# Patient Record
Sex: FEMALE | Race: WHITE | Employment: OTHER | URBAN - NONMETROPOLITAN AREA
[De-identification: names, ages, dates, MRNs, and addresses within clinical notes are randomized per-mention and may not be internally consistent; named-entity substitution may affect disease eponyms.]

---

## 2020-08-12 DIAGNOSIS — M17.11 PRIMARY OSTEOARTHRITIS OF RIGHT KNEE: Primary | ICD-10-CM

## 2020-09-16 ENCOUNTER — HOSPITAL ENCOUNTER (OUTPATIENT)
Dept: GENERAL RADIOLOGY | Age: 70
Discharge: HOME | End: 2020-09-16
Payer: MEDICARE

## 2020-09-16 ENCOUNTER — HOSPITAL ENCOUNTER (OUTPATIENT)
Dept: LAB | Age: 70
Discharge: HOME | End: 2020-09-16
Payer: MEDICARE

## 2020-09-16 ENCOUNTER — HOSPITAL ENCOUNTER (OUTPATIENT)
Dept: NON INVASIVE DIAGNOSTICS | Age: 70
Discharge: HOME | End: 2020-09-16
Payer: MEDICARE

## 2020-09-16 DIAGNOSIS — M17.11 PRIMARY OSTEOARTHRITIS OF RIGHT KNEE: ICD-10-CM

## 2020-09-16 LAB
ANION GAP SERPL CALC-SCNC: 7 MMOL/L
ATRIAL RATE: 87 BPM
BASOPHILS # BLD: 0 K/UL (ref 0–0.1)
BASOPHILS NFR BLD: 0 % (ref 0–2)
BUN SERPL-MCNC: 27 MG/DL (ref 9–21)
BUN/CREAT SERPL: 39
CA-I BLD-MCNC: 9.3 MG/DL (ref 8.5–10.5)
CALCULATED P AXIS, ECG09: 22 DEGREES
CALCULATED R AXIS, ECG10: 16 DEGREES
CALCULATED T AXIS, ECG11: 25 DEGREES
CHLORIDE SERPL-SCNC: 106 MMOL/L (ref 94–111)
CO2 SERPL-SCNC: 26 MMOL/L (ref 21–33)
CREAT SERPL-MCNC: 0.7 MG/DL (ref 0.7–1.2)
DIAGNOSIS, 93000: NORMAL
EOSINOPHIL # BLD: 0.1 K/UL (ref 0–0.4)
EOSINOPHIL NFR BLD: 3 % (ref 0–5)
ERYTHROCYTE [DISTWIDTH] IN BLOOD BY AUTOMATED COUNT: 13.1 % (ref 11.6–14.5)
GLUCOSE SERPL-MCNC: 94 MG/DL (ref 70–110)
HCT VFR BLD AUTO: 41.5 % (ref 35–45)
HGB BLD-MCNC: 13.2 % (ref 12–16)
IMM GRANULOCYTES # BLD AUTO: 0 K/UL
IMM GRANULOCYTES NFR BLD AUTO: 0 %
LYMPHOCYTES # BLD: 1.8 K/UL (ref 0.9–3.6)
LYMPHOCYTES NFR BLD: 38 % (ref 21–52)
MCH RBC QN AUTO: 31.5 PG (ref 24–34)
MCHC RBC AUTO-ENTMCNC: 31.8 G/DL (ref 31–37)
MCV RBC AUTO: 99 FL (ref 74–97)
MONOCYTES # BLD: 0.4 K/UL (ref 0.05–1.2)
MONOCYTES NFR BLD: 8 % (ref 3–10)
NEUTS SEG # BLD: 2.5 K/UL (ref 1.8–8)
NEUTS SEG NFR BLD: 51 % (ref 40–73)
P-R INTERVAL, ECG05: 158 MS
PLATELET # BLD AUTO: 361 K/UL (ref 135–420)
PMV BLD AUTO: 10.3 FL
POTASSIUM SERPL-SCNC: 3.4 MMOL/L (ref 3.2–5.1)
Q-T INTERVAL, ECG07: 387 MS
QRS DURATION, ECG06: 103 MS
QTC CALCULATION (BEZET), ECG08: 466 MS
RBC # BLD AUTO: 4.19 M/UL (ref 4.2–5.3)
SODIUM SERPL-SCNC: 139 MMOL/L (ref 135–145)
VENTRICULAR RATE, ECG03: 87 BPM
WBC # BLD AUTO: 4.8 K/UL (ref 4.6–13.2)

## 2020-09-16 PROCEDURE — 71046 X-RAY EXAM CHEST 2 VIEWS: CPT

## 2020-09-16 PROCEDURE — 85025 COMPLETE CBC W/AUTO DIFF WBC: CPT

## 2020-09-16 PROCEDURE — 93005 ELECTROCARDIOGRAM TRACING: CPT

## 2020-09-16 PROCEDURE — 80048 BASIC METABOLIC PNL TOTAL CA: CPT

## 2020-09-16 PROCEDURE — 36415 COLL VENOUS BLD VENIPUNCTURE: CPT

## 2020-09-25 PROBLEM — I10 ESSENTIAL HYPERTENSION: Status: ACTIVE | Noted: 2020-09-25

## 2020-09-25 RX ORDER — BUPROPION HYDROCHLORIDE 150 MG/1
150 TABLET ORAL
COMMUNITY

## 2020-09-25 RX ORDER — ASPIRIN 81 MG/1
81 TABLET ORAL DAILY
COMMUNITY

## 2020-09-25 RX ORDER — CALCIUM CARBONATE/VITAMIN D3 600 MG-125
1 TABLET ORAL
COMMUNITY

## 2020-09-25 RX ORDER — SIMVASTATIN 40 MG/1
40 TABLET, FILM COATED ORAL
COMMUNITY

## 2020-09-25 RX ORDER — PANTOPRAZOLE SODIUM 40 MG/1
40 TABLET, DELAYED RELEASE ORAL DAILY
COMMUNITY

## 2020-09-25 RX ORDER — BISMUTH SUBSALICYLATE 262 MG
1 TABLET,CHEWABLE ORAL DAILY
COMMUNITY

## 2020-09-25 RX ORDER — ERGOCALCIFEROL 1.25 MG/1
50000 CAPSULE ORAL
COMMUNITY

## 2020-09-25 RX ORDER — SULINDAC 200 MG/1
200 TABLET ORAL 2 TIMES DAILY
COMMUNITY

## 2020-09-25 RX ORDER — LOSARTAN POTASSIUM AND HYDROCHLOROTHIAZIDE 25; 100 MG/1; MG/1
1 TABLET ORAL DAILY
COMMUNITY

## 2020-09-28 ENCOUNTER — TRANSCRIBE ORDER (OUTPATIENT)
Dept: REGISTRATION | Age: 70
End: 2020-09-28

## 2020-09-28 ENCOUNTER — HOSPITAL ENCOUNTER (OUTPATIENT)
Dept: PREADMISSION TESTING | Age: 70
Discharge: HOME | End: 2020-09-28
Payer: MEDICARE

## 2020-09-28 ENCOUNTER — HOSPITAL ENCOUNTER (OUTPATIENT)
Dept: LAB | Age: 70
Discharge: HOME | End: 2020-09-28
Payer: MEDICARE

## 2020-09-28 ENCOUNTER — OFFICE VISIT (OUTPATIENT)
Dept: ORTHOPEDIC SURGERY | Age: 70
End: 2020-09-28

## 2020-09-28 VITALS — BODY MASS INDEX: 39.65 KG/M2 | WEIGHT: 210 LBS | HEIGHT: 61 IN

## 2020-09-28 DIAGNOSIS — M17.11 PRIMARY OSTEOARTHRITIS OF RIGHT KNEE: Primary | ICD-10-CM

## 2020-09-28 DIAGNOSIS — M25.561 CHRONIC PAIN OF RIGHT KNEE: ICD-10-CM

## 2020-09-28 DIAGNOSIS — G89.29 CHRONIC PAIN OF RIGHT KNEE: ICD-10-CM

## 2020-09-28 DIAGNOSIS — E66.01 SEVERE OBESITY (HCC): ICD-10-CM

## 2020-09-28 DIAGNOSIS — M17.11 OSTEOARTHRITIS OF RIGHT KNEE: ICD-10-CM

## 2020-09-28 DIAGNOSIS — M17.11 OSTEOARTHRITIS OF RIGHT KNEE: Primary | ICD-10-CM

## 2020-09-28 LAB — SARS-COV-2, COV2: NORMAL

## 2020-09-28 PROCEDURE — 87635 SARS-COV-2 COVID-19 AMP PRB: CPT

## 2020-09-28 RX ORDER — OXYCODONE AND ACETAMINOPHEN 5; 325 MG/1; MG/1
1 TABLET ORAL
Qty: 30 TAB | Refills: 0 | Status: SHIPPED | OUTPATIENT
Start: 2020-09-28 | End: 2020-10-05

## 2020-09-28 RX ORDER — CEPHALEXIN 500 MG/1
500 CAPSULE ORAL 4 TIMES DAILY
Qty: 32 CAP | Refills: 0 | Status: SHIPPED | OUTPATIENT
Start: 2020-09-28 | End: 2020-10-06

## 2020-09-28 NOTE — H&P (VIEW-ONLY)
[facility] Preop Visit Subjective: 
  
Dori Olmstead is a 71 y.o. female who presents today for preoperative visit in preparation for upcoming right total knee replacement to be performed by Dr. Scott Gomez, on an outpatient basis. Xrays and additional studies, as appropriate, have been reviewed. Pt currently without new complaint. Past Medical History:  
Diagnosis Date  Dyslipidemia with elevated low density lipoprotein (LDL) cholesterol and abnormally low high density lipoprotein cholesterol  Essential hypertension 9/25/2020  GERD (gastroesophageal reflux disease)  Hypertension Past Surgical History:  
Procedure Laterality Date  HX ORTHOPAEDIC    
 foot surgery Current Outpatient Medications Medication Sig Dispense Refill  sulindac (CLINORIL) 200 mg tablet Take 200 mg by mouth two (2) times a day.  pantoprazole (PROTONIX) 40 mg tablet Take 40 mg by mouth daily.  losartan-hydroCHLOROthiazide (HYZAAR) 100-25 mg per tablet Take 1 Tab by mouth daily.  buPROPion XL (WELLBUTRIN XL) 150 mg tablet Take 150 mg by mouth every morning.  simvastatin (ZOCOR) 40 mg tablet Take 40 mg by mouth nightly.  ergocalciferol (Vitamin D2) 1,250 mcg (50,000 unit) capsule Take 50,000 Units by mouth every seven (7) days. Indications: saturday  aspirin delayed-release 81 mg tablet Take 81 mg by mouth daily.  multivitamin (ONE A DAY) tablet Take 1 Tab by mouth daily.  calcium-cholecalciferol, d3, (CALCIUM 600 + D) 600-125 mg-unit tab Take 1 Tab by mouth. No Known Allergies ROS: 
 
Patient is a pleasant appearing individual, appropriately dressed, well hydrated, well nourished, who is alert, appropriately oriented for age, and in no acute distress with a limp gait and normal affect who does not appear to be in any significant pain. Remainder of ROS as per HPI. Objective:  
 
Physical Exam: VSS AFEB Left knee - Neurovascularly intact with good cap refill, full range of motion and full strength, well healed incision noted, no swelling, no erythema, no instability. Right knee - Decrease range of motion with flexion, Some crepitation, Grossly neurovascularly intact, Good cap refill, No skin lesion, Moderate swelling, No gross instability, Some quadriceps weakness. Obvious cat scratch without e/o infection. Studies to date: 
 
Xrays: And all diagnostic studies have been reviewed Labs: Have been reviewed General Medicine evaluation: She has been cleared for surgery by her primary care team. 
 
Post operative Antibiotics: Keflex which we will start today as a result of her recent cat scratch. She will contact the office on Friday if she does not feel as though the wounds are improved. Post operative Pain Medications: Oxycodone Post operative Blood thinning meds: Aspirin Post operative medications, DME and physical therapy prescriptions have been provided. (Meds sent to pharmacy) Assessment:  
Primary osteoarthritis of right knee [M17.11] Lucero Uriarte is a 71 y.o. female who is planning to undergo a right total knee replacement to be performed by Dr. Rico Dexter in the near future. We will plan to proceed on an outpatient  basis. At this time, they are an appropriate candidate for surgery. The risks, benefits, complications and alternatives have been outlined with the patient at length and they voice an understanding. Based on the fact that we agree that the potential benefits outweigh the potential lists, we will plan to proceed as discussed. The patient was counseled about the risks of misha Covid-19 during their perioperative period and any recovery window from their procedure.  The patient was made aware that misha Covid-19 may worsen their prognosis for recovering from their procedure and lend to a higher morbidity and/or mortality risk. All material risks, benefits, and reasonable alternatives including postponing the procedure were discussed. The patient DOES wish to proceed with their procedure at this time. Ms. Divina Morse has a reminder for a \"due or due soon\" health maintenance. I have asked that she contact her primary care provider for follow-up on this health maintenance. Plan:  
-Proceed with right total knee arthroplasty to be performed by DR. Whelan at [facility] on an outpatient  basis. -Preoperative instructions have been reviewed with and provided to the patient, at length 
-Patient voices understanding that any skin abnormality to the skin at the site of the involved area of planned surgery may result in cancellation and/or postponement of surgery. 
-Physical therapy  to start shortly after surgery. 
-Follow up 1 week postoperatively with me for close follow up.  
 
Alejandra Frank, MS, PAYifanC

## 2020-09-28 NOTE — PATIENT INSTRUCTIONS
Pre operative instructions 1. Start antibiotics day after surgery unless otherwise instructed. 2. Pain meds, start the night you have surgery whether you need it or not. Your block will be wearing off and we want meds in your system. After initial dose, use only as needed. 3. Remove Ace wrap 48 hours after surgery. 4. Compression stockings-on your operative leg for 3 weeks, non-operative leg for 1 week 5. Bandage will be removed by Dr. Ventura Acosta staff 1 week post op. 6. Ice for 20-30 minutes no more than 5 times daily 7. Aspirin 325 mg twice daily to start day after surgery 8. No showers until first follow up visit (1 week postop). 9. No driving until instructed you may drive by Dr. Ventura Acosta staff 10. No alcohol prior to surgery and/or while on pain meds postop. 11. Incentive spirometry to begin immediately postop. IS, Deep Breathing  and Coughing 10X/hr while awake for 2 weeks. 12. Outpatient PT 1-3 days postop. Any questions, thoughts feelings and/or concerns contact the office.

## 2020-09-28 NOTE — PROGRESS NOTES
[facility]  Preop Visit    Subjective:     Susan Malone is a 71 y.o. female who presents today for preoperative visit in preparation for upcoming right total knee replacement to be performed by Dr. Bonnie Dean, on an outpatient basis. Xrays and additional studies, as appropriate, have been reviewed. Pt currently without new complaint. Past Medical History:   Diagnosis Date    Dyslipidemia with elevated low density lipoprotein (LDL) cholesterol and abnormally low high density lipoprotein cholesterol     Essential hypertension 9/25/2020    GERD (gastroesophageal reflux disease)     Hypertension        Past Surgical History:   Procedure Laterality Date    HX ORTHOPAEDIC      foot surgery       Current Outpatient Medications   Medication Sig Dispense Refill    sulindac (CLINORIL) 200 mg tablet Take 200 mg by mouth two (2) times a day.  pantoprazole (PROTONIX) 40 mg tablet Take 40 mg by mouth daily.  losartan-hydroCHLOROthiazide (HYZAAR) 100-25 mg per tablet Take 1 Tab by mouth daily.  buPROPion XL (WELLBUTRIN XL) 150 mg tablet Take 150 mg by mouth every morning.  simvastatin (ZOCOR) 40 mg tablet Take 40 mg by mouth nightly.  ergocalciferol (Vitamin D2) 1,250 mcg (50,000 unit) capsule Take 50,000 Units by mouth every seven (7) days. Indications: saturday      aspirin delayed-release 81 mg tablet Take 81 mg by mouth daily.  multivitamin (ONE A DAY) tablet Take 1 Tab by mouth daily.  calcium-cholecalciferol, d3, (CALCIUM 600 + D) 600-125 mg-unit tab Take 1 Tab by mouth. No Known Allergies    ROS:    Patient is a pleasant appearing individual, appropriately dressed, well hydrated, well nourished, who is alert, appropriately oriented for age, and in no acute distress with a limp gait and normal affect who does not appear to be in any significant pain. Remainder of ROS as per HPI.           Objective:     Physical Exam:  VSS AFEB    Left knee - Neurovascularly intact with good cap refill, full range of motion and full strength, well healed incision noted, no swelling, no erythema, no instability. Right knee - Decrease range of motion with flexion, Some crepitation, Grossly neurovascularly intact, Good cap refill, No skin lesion, Moderate swelling, No gross instability, Some quadriceps weakness. Obvious cat scratch without e/o infection. Studies to date:    Xrays: And all diagnostic studies have been reviewed    Labs: Have been reviewed    General Medicine evaluation: She has been cleared for surgery by her primary care team.    Post operative Antibiotics: Keflex which we will start today as a result of her recent cat scratch. She will contact the office on Friday if she does not feel as though the wounds are improved. Post operative Pain Medications: Oxycodone  Post operative Blood thinning meds: Aspirin    Post operative medications, DME and physical therapy prescriptions have been provided. (Meds sent to pharmacy)    Assessment:   Primary osteoarthritis of right knee [M17.11]     Bindu Michelle is a 71 y.o. female who is planning to undergo a right total knee replacement to be performed by Dr. Hector Montilla in the near future. We will plan to proceed on an outpatient  basis. At this time, they are an appropriate candidate for surgery. The risks, benefits, complications and alternatives have been outlined with the patient at length and they voice an understanding. Based on the fact that we agree that the potential benefits outweigh the potential lists, we will plan to proceed as discussed. The patient was counseled about the risks of misha Covid-19 during their perioperative period and any recovery window from their procedure. The patient was made aware that misha Covid-19 may worsen their prognosis for recovering from their procedure and lend to a higher morbidity and/or mortality risk.  All material risks, benefits, and reasonable alternatives including postponing the procedure were discussed. The patient DOES wish to proceed with their procedure at this time. Ms. Mckenna Waters has a reminder for a \"due or due soon\" health maintenance. I have asked that she contact her primary care provider for follow-up on this health maintenance. Plan:   -Proceed with right total knee arthroplasty to be performed by DR[facility] Whelan at [facility] on an outpatient  basis. -Preoperative instructions have been reviewed with and provided to the patient, at length  -Patient voices understanding that any skin abnormality to the skin at the site of the involved area of planned surgery may result in cancellation and/or postponement of surgery.  -Physical therapy  to start shortly after surgery.  -Follow up 1 week postoperatively with me for close follow up.     Camilla Felton MS, PAYifanC

## 2020-09-29 LAB
CULTURE,CULT: NORMAL
CULTURE,CULT: NORMAL
SPECIAL REQUESTS,SREQ: NORMAL

## 2020-10-01 LAB — SARS-COV-2, COV2NT: NOT DETECTED

## 2020-10-02 ENCOUNTER — ANESTHESIA EVENT (OUTPATIENT)
Dept: SURGERY | Age: 70
End: 2020-10-02
Payer: MEDICARE

## 2020-10-05 ENCOUNTER — ANESTHESIA (OUTPATIENT)
Dept: SURGERY | Age: 70
End: 2020-10-05
Payer: MEDICARE

## 2020-10-05 ENCOUNTER — HOSPITAL ENCOUNTER (OUTPATIENT)
Age: 70
Setting detail: OUTPATIENT SURGERY
Discharge: HOME | End: 2020-10-05
Attending: ORTHOPAEDIC SURGERY | Admitting: ORTHOPAEDIC SURGERY
Payer: MEDICARE

## 2020-10-05 ENCOUNTER — APPOINTMENT (OUTPATIENT)
Dept: GENERAL RADIOLOGY | Age: 70
End: 2020-10-05
Attending: ORTHOPAEDIC SURGERY
Payer: MEDICARE

## 2020-10-05 VITALS
HEIGHT: 61 IN | RESPIRATION RATE: 20 BRPM | BODY MASS INDEX: 39.65 KG/M2 | HEART RATE: 88 BPM | WEIGHT: 210 LBS | DIASTOLIC BLOOD PRESSURE: 78 MMHG | SYSTOLIC BLOOD PRESSURE: 149 MMHG | TEMPERATURE: 98 F | OXYGEN SATURATION: 95 %

## 2020-10-05 DIAGNOSIS — M17.11 PRIMARY OSTEOARTHRITIS OF RIGHT KNEE: Primary | ICD-10-CM

## 2020-10-05 PROBLEM — M17.9 KNEE OSTEOARTHRITIS: Status: ACTIVE | Noted: 2020-10-05

## 2020-10-05 LAB
ABO + RH BLD: NORMAL
BLOOD GROUP ANTIBODIES SERPL: NEGATIVE
SPECIMEN EXP DATE BLD: NORMAL

## 2020-10-05 PROCEDURE — 97161 PT EVAL LOW COMPLEX 20 MIN: CPT

## 2020-10-05 PROCEDURE — C1713 ANCHOR/SCREW BN/BN,TIS/BN: HCPCS | Performed by: ORTHOPAEDIC SURGERY

## 2020-10-05 PROCEDURE — C1776 JOINT DEVICE (IMPLANTABLE): HCPCS | Performed by: ORTHOPAEDIC SURGERY

## 2020-10-05 PROCEDURE — 74011000272 HC RX REV CODE- 272: Performed by: ORTHOPAEDIC SURGERY

## 2020-10-05 PROCEDURE — 77030041690 HC SYS PINNING KN JNJ -D: Performed by: ORTHOPAEDIC SURGERY

## 2020-10-05 PROCEDURE — 74011250637 HC RX REV CODE- 250/637

## 2020-10-05 PROCEDURE — 76210000025 HC REC RM PH II 3 TO 3.5 HR: Performed by: ORTHOPAEDIC SURGERY

## 2020-10-05 PROCEDURE — 77030031139 HC SUT VCRL2 J&J -A: Performed by: ORTHOPAEDIC SURGERY

## 2020-10-05 PROCEDURE — 77030029372 HC ADH SKN CLSR PRINEO J&J -C: Performed by: ORTHOPAEDIC SURGERY

## 2020-10-05 PROCEDURE — 77030040375: Performed by: ORTHOPAEDIC SURGERY

## 2020-10-05 PROCEDURE — 77030002933 HC SUT MCRYL J&J -A: Performed by: ORTHOPAEDIC SURGERY

## 2020-10-05 PROCEDURE — 76060000035 HC ANESTHESIA 2 TO 2.5 HR: Performed by: ORTHOPAEDIC SURGERY

## 2020-10-05 PROCEDURE — 73560 X-RAY EXAM OF KNEE 1 OR 2: CPT

## 2020-10-05 PROCEDURE — 74011250636 HC RX REV CODE- 250/636: Performed by: NURSE ANESTHETIST, CERTIFIED REGISTERED

## 2020-10-05 PROCEDURE — 77030006835 HC BLD SAW SAG STRY -B: Performed by: ORTHOPAEDIC SURGERY

## 2020-10-05 PROCEDURE — 77030006812 HC BLD SAW RECIP STRY -B: Performed by: ORTHOPAEDIC SURGERY

## 2020-10-05 PROCEDURE — 77030042022 HC SYST COLD THRPY BREG -B: Performed by: ORTHOPAEDIC SURGERY

## 2020-10-05 PROCEDURE — 77030002982 HC SUT POLYSRB J&J -A: Performed by: ORTHOPAEDIC SURGERY

## 2020-10-05 PROCEDURE — 74011000258 HC RX REV CODE- 258: Performed by: NURSE ANESTHETIST, CERTIFIED REGISTERED

## 2020-10-05 PROCEDURE — 76010000131 HC OR TIME 2 TO 2.5 HR: Performed by: ORTHOPAEDIC SURGERY

## 2020-10-05 PROCEDURE — 86900 BLOOD TYPING SEROLOGIC ABO: CPT

## 2020-10-05 PROCEDURE — 77030013708 HC HNDPC SUC IRR PULS STRY –B: Performed by: ORTHOPAEDIC SURGERY

## 2020-10-05 PROCEDURE — 77030000032 HC CUF TRNQT ZIMM -B: Performed by: ORTHOPAEDIC SURGERY

## 2020-10-05 PROCEDURE — 74011250637 HC RX REV CODE- 250/637: Performed by: NURSE ANESTHETIST, CERTIFIED REGISTERED

## 2020-10-05 PROCEDURE — 74011250636 HC RX REV CODE- 250/636

## 2020-10-05 PROCEDURE — 74011000258 HC RX REV CODE- 258

## 2020-10-05 PROCEDURE — 76210000006 HC OR PH I REC 0.5 TO 1 HR: Performed by: ORTHOPAEDIC SURGERY

## 2020-10-05 PROCEDURE — 74011000250 HC RX REV CODE- 250: Performed by: NURSE ANESTHETIST, CERTIFIED REGISTERED

## 2020-10-05 PROCEDURE — 74011000250 HC RX REV CODE- 250: Performed by: ORTHOPAEDIC SURGERY

## 2020-10-05 PROCEDURE — 77030010783 HC BOWL MX BN CEM J&J -B: Performed by: ORTHOPAEDIC SURGERY

## 2020-10-05 PROCEDURE — 2709999900 HC NON-CHARGEABLE SUPPLY: Performed by: ORTHOPAEDIC SURGERY

## 2020-10-05 PROCEDURE — 77030018673: Performed by: ORTHOPAEDIC SURGERY

## 2020-10-05 DEVICE — COMPONENT PAT DIA35MM KNEE POLY DOME CEM MEDIALIZED ATTUNE: Type: IMPLANTABLE DEVICE | Site: KNEE | Status: FUNCTIONAL

## 2020-10-05 DEVICE — INSERT TIB SZ 5 THK7MM KNEE POST STBL ROT PLATFRM ATTUNE: Type: IMPLANTABLE DEVICE | Site: KNEE | Status: FUNCTIONAL

## 2020-10-05 DEVICE — BASEPLATE TIB SZ 4 ROT PLATFRM CO CHROM MOLYBDENUM TI ALLY: Type: IMPLANTABLE DEVICE | Site: KNEE | Status: FUNCTIONAL

## 2020-10-05 DEVICE — COMPONENT FEM SZ 5 R KNEE NAR POST STBL CEM ATTUNE: Type: IMPLANTABLE DEVICE | Site: KNEE | Status: FUNCTIONAL

## 2020-10-05 DEVICE — KNEE K1 TOT HEMI STD CEM IMPL CAPPED SYNTHES: Type: IMPLANTABLE DEVICE | Status: FUNCTIONAL

## 2020-10-05 RX ORDER — SENNOSIDES 8.6 MG/1
1 TABLET ORAL 2 TIMES DAILY
Status: DISCONTINUED | OUTPATIENT
Start: 2020-10-05 | End: 2020-10-05 | Stop reason: HOSPADM

## 2020-10-05 RX ORDER — SODIUM CHLORIDE, SODIUM LACTATE, POTASSIUM CHLORIDE, CALCIUM CHLORIDE 600; 310; 30; 20 MG/100ML; MG/100ML; MG/100ML; MG/100ML
50 INJECTION, SOLUTION INTRAVENOUS CONTINUOUS
Status: DISCONTINUED | OUTPATIENT
Start: 2020-10-05 | End: 2020-10-05 | Stop reason: HOSPADM

## 2020-10-05 RX ORDER — MIDAZOLAM HYDROCHLORIDE 1 MG/ML
INJECTION, SOLUTION INTRAMUSCULAR; INTRAVENOUS AS NEEDED
Status: DISCONTINUED | OUTPATIENT
Start: 2020-10-05 | End: 2020-10-05 | Stop reason: HOSPADM

## 2020-10-05 RX ORDER — NALOXONE HYDROCHLORIDE 0.4 MG/ML
0.1 INJECTION, SOLUTION INTRAMUSCULAR; INTRAVENOUS; SUBCUTANEOUS AS NEEDED
Status: DISCONTINUED | OUTPATIENT
Start: 2020-10-05 | End: 2020-10-05

## 2020-10-05 RX ORDER — DEXAMETHASONE SODIUM PHOSPHATE 4 MG/ML
INJECTION, SOLUTION INTRA-ARTICULAR; INTRALESIONAL; INTRAMUSCULAR; INTRAVENOUS; SOFT TISSUE
Status: SHIPPED | OUTPATIENT
Start: 2020-10-05 | End: 2020-10-05

## 2020-10-05 RX ORDER — GABAPENTIN 300 MG/1
300 CAPSULE ORAL ONCE
Status: COMPLETED | OUTPATIENT
Start: 2020-10-05 | End: 2020-10-05

## 2020-10-05 RX ORDER — BUPIVACAINE HYDROCHLORIDE 5 MG/ML
INJECTION, SOLUTION EPIDURAL; INTRACAUDAL
Status: SHIPPED | OUTPATIENT
Start: 2020-10-05 | End: 2020-10-05

## 2020-10-05 RX ORDER — BUPIVACAINE HYDROCHLORIDE 7.5 MG/ML
INJECTION, SOLUTION INTRASPINAL
Status: SHIPPED | OUTPATIENT
Start: 2020-10-05 | End: 2020-10-05

## 2020-10-05 RX ORDER — NALOXONE HYDROCHLORIDE 0.4 MG/ML
0.4 INJECTION, SOLUTION INTRAMUSCULAR; INTRAVENOUS; SUBCUTANEOUS AS NEEDED
Status: DISCONTINUED | OUTPATIENT
Start: 2020-10-05 | End: 2020-10-05 | Stop reason: HOSPADM

## 2020-10-05 RX ORDER — SODIUM CHLORIDE, SODIUM LACTATE, POTASSIUM CHLORIDE, CALCIUM CHLORIDE 600; 310; 30; 20 MG/100ML; MG/100ML; MG/100ML; MG/100ML
INJECTION, SOLUTION INTRAVENOUS
Status: DISCONTINUED | OUTPATIENT
Start: 2020-10-05 | End: 2020-10-05 | Stop reason: HOSPADM

## 2020-10-05 RX ORDER — ASPIRIN 325 MG
325 TABLET, DELAYED RELEASE (ENTERIC COATED) ORAL 2 TIMES DAILY
Status: DISCONTINUED | OUTPATIENT
Start: 2020-10-06 | End: 2020-10-05 | Stop reason: HOSPADM

## 2020-10-05 RX ORDER — CELECOXIB 200 MG/1
400 CAPSULE ORAL
Status: DISCONTINUED | OUTPATIENT
Start: 2020-10-05 | End: 2020-10-05 | Stop reason: HOSPADM

## 2020-10-05 RX ORDER — OXYCODONE AND ACETAMINOPHEN 10; 325 MG/1; MG/1
1 TABLET ORAL
Status: DISCONTINUED | OUTPATIENT
Start: 2020-10-05 | End: 2020-10-05 | Stop reason: HOSPADM

## 2020-10-05 RX ORDER — OXYCODONE AND ACETAMINOPHEN 5; 325 MG/1; MG/1
2 TABLET ORAL
Status: DISCONTINUED | OUTPATIENT
Start: 2020-10-05 | End: 2020-10-05 | Stop reason: HOSPADM

## 2020-10-05 RX ORDER — POLYMYXIN B 500000 [USP'U]/1
INJECTION, POWDER, LYOPHILIZED, FOR SOLUTION INTRAMUSCULAR; INTRATHECAL; INTRAVENOUS; OPHTHALMIC AS NEEDED
Status: DISCONTINUED | OUTPATIENT
Start: 2020-10-05 | End: 2020-10-05 | Stop reason: HOSPADM

## 2020-10-05 RX ORDER — SODIUM CHLORIDE 0.9 G/100ML
IRRIGANT IRRIGATION AS NEEDED
Status: DISCONTINUED | OUTPATIENT
Start: 2020-10-05 | End: 2020-10-05 | Stop reason: HOSPADM

## 2020-10-05 RX ORDER — KETOROLAC TROMETHAMINE 30 MG/ML
15 INJECTION, SOLUTION INTRAMUSCULAR; INTRAVENOUS
Status: DISCONTINUED | OUTPATIENT
Start: 2020-10-05 | End: 2020-10-05 | Stop reason: HOSPADM

## 2020-10-05 RX ORDER — CELECOXIB 200 MG/1
CAPSULE ORAL
Status: COMPLETED
Start: 2020-10-05 | End: 2020-10-05

## 2020-10-05 RX ORDER — SODIUM CHLORIDE 0.9 % (FLUSH) 0.9 %
5-40 SYRINGE (ML) INJECTION EVERY 8 HOURS
Status: DISCONTINUED | OUTPATIENT
Start: 2020-10-05 | End: 2020-10-05 | Stop reason: HOSPADM

## 2020-10-05 RX ORDER — ACETAMINOPHEN 325 MG/1
650 TABLET ORAL
Status: DISCONTINUED | OUTPATIENT
Start: 2020-10-05 | End: 2020-10-05 | Stop reason: HOSPADM

## 2020-10-05 RX ORDER — ONDANSETRON 2 MG/ML
4 INJECTION INTRAMUSCULAR; INTRAVENOUS ONCE
Status: DISCONTINUED | OUTPATIENT
Start: 2020-10-05 | End: 2020-10-05 | Stop reason: HOSPADM

## 2020-10-05 RX ORDER — SODIUM CHLORIDE 0.9 % (FLUSH) 0.9 %
5-40 SYRINGE (ML) INJECTION AS NEEDED
Status: DISCONTINUED | OUTPATIENT
Start: 2020-10-05 | End: 2020-10-05 | Stop reason: HOSPADM

## 2020-10-05 RX ORDER — PROPOFOL 10 MG/ML
VIAL (ML) INTRAVENOUS
Status: DISCONTINUED | OUTPATIENT
Start: 2020-10-05 | End: 2020-10-05 | Stop reason: HOSPADM

## 2020-10-05 RX ORDER — KETAMINE HYDROCHLORIDE 10 MG/ML
INJECTION, SOLUTION INTRAMUSCULAR; INTRAVENOUS AS NEEDED
Status: DISCONTINUED | OUTPATIENT
Start: 2020-10-05 | End: 2020-10-05 | Stop reason: HOSPADM

## 2020-10-05 RX ORDER — ONDANSETRON 2 MG/ML
4 INJECTION INTRAMUSCULAR; INTRAVENOUS
Status: DISCONTINUED | OUTPATIENT
Start: 2020-10-05 | End: 2020-10-05 | Stop reason: HOSPADM

## 2020-10-05 RX ORDER — MORPHINE SULFATE 2 MG/ML
2 INJECTION, SOLUTION INTRAMUSCULAR; INTRAVENOUS
Status: DISCONTINUED | OUTPATIENT
Start: 2020-10-05 | End: 2020-10-05 | Stop reason: HOSPADM

## 2020-10-05 RX ORDER — BUPIVACAINE HYDROCHLORIDE AND EPINEPHRINE 2.5; 5 MG/ML; UG/ML
INJECTION, SOLUTION INFILTRATION; PERINEURAL AS NEEDED
Status: DISCONTINUED | OUTPATIENT
Start: 2020-10-05 | End: 2020-10-05 | Stop reason: HOSPADM

## 2020-10-05 RX ORDER — ACETAMINOPHEN 500 MG
1000 TABLET ORAL ONCE
Status: COMPLETED | OUTPATIENT
Start: 2020-10-05 | End: 2020-10-05

## 2020-10-05 RX ORDER — DIPHENHYDRAMINE HCL 25 MG
25 TABLET ORAL
Status: DISCONTINUED | OUTPATIENT
Start: 2020-10-05 | End: 2020-10-05 | Stop reason: HOSPADM

## 2020-10-05 RX ORDER — FACIAL-BODY WIPES
10 EACH TOPICAL DAILY PRN
Status: DISCONTINUED | OUTPATIENT
Start: 2020-10-05 | End: 2020-10-05 | Stop reason: HOSPADM

## 2020-10-05 RX ORDER — FENTANYL CITRATE 50 UG/ML
25 INJECTION, SOLUTION INTRAMUSCULAR; INTRAVENOUS AS NEEDED
Status: DISCONTINUED | OUTPATIENT
Start: 2020-10-05 | End: 2020-10-05 | Stop reason: HOSPADM

## 2020-10-05 RX ORDER — EPHEDRINE SULFATE/0.9% NACL/PF 50 MG/5 ML
SYRINGE (ML) INTRAVENOUS AS NEEDED
Status: DISCONTINUED | OUTPATIENT
Start: 2020-10-05 | End: 2020-10-05 | Stop reason: HOSPADM

## 2020-10-05 RX ADMIN — FENTANYL CITRATE 25 MCG: 50 INJECTION, SOLUTION INTRAMUSCULAR; INTRAVENOUS at 10:31

## 2020-10-05 RX ADMIN — MIDAZOLAM 2 MG: 1 INJECTION INTRAMUSCULAR; INTRAVENOUS at 08:02

## 2020-10-05 RX ADMIN — ACETAMINOPHEN 1000 MG: 500 TABLET, FILM COATED ORAL at 06:52

## 2020-10-05 RX ADMIN — KETAMINE HYDROCHLORIDE 10 MG: 10 INJECTION, SOLUTION INTRAMUSCULAR; INTRAVENOUS at 07:51

## 2020-10-05 RX ADMIN — BUPIVACAINE HYDROCHLORIDE 25 ML: 5 INJECTION, SOLUTION EPIDURAL; INTRACAUDAL at 07:53

## 2020-10-05 RX ADMIN — PROPOFOL 40 MCG/KG/MIN: 10 INJECTION, EMULSION INTRAVENOUS at 08:10

## 2020-10-05 RX ADMIN — BUPIVACAINE HYDROCHLORIDE IN DEXTROSE 10.5 MG: 7.5 INJECTION, SOLUTION SUBARACHNOID at 08:06

## 2020-10-05 RX ADMIN — MIDAZOLAM 2 MG: 1 INJECTION INTRAMUSCULAR; INTRAVENOUS at 07:50

## 2020-10-05 RX ADMIN — TRANEXAMIC ACID 1 G: 1 INJECTION, SOLUTION INTRAVENOUS at 08:15

## 2020-10-05 RX ADMIN — KETAMINE HYDROCHLORIDE 10 MG: 10 INJECTION, SOLUTION INTRAMUSCULAR; INTRAVENOUS at 08:03

## 2020-10-05 RX ADMIN — SODIUM CHLORIDE, POTASSIUM CHLORIDE, SODIUM LACTATE AND CALCIUM CHLORIDE 50 ML/HR: 600; 310; 30; 20 INJECTION, SOLUTION INTRAVENOUS at 07:11

## 2020-10-05 RX ADMIN — Medication 20 MG: at 08:29

## 2020-10-05 RX ADMIN — Medication 20 MG: at 08:51

## 2020-10-05 RX ADMIN — SODIUM CHLORIDE, POTASSIUM CHLORIDE, SODIUM LACTATE AND CALCIUM CHLORIDE: 600; 310; 30; 20 INJECTION, SOLUTION INTRAVENOUS at 07:50

## 2020-10-05 RX ADMIN — FENTANYL CITRATE 25 MCG: 50 INJECTION, SOLUTION INTRAMUSCULAR; INTRAVENOUS at 10:41

## 2020-10-05 RX ADMIN — GABAPENTIN 300 MG: 300 CAPSULE ORAL at 06:52

## 2020-10-05 RX ADMIN — KETAMINE HYDROCHLORIDE 4 MCG/KG/MIN: 10 INJECTION, SOLUTION INTRAMUSCULAR; INTRAVENOUS at 08:10

## 2020-10-05 RX ADMIN — CELECOXIB 400 MG: 200 CAPSULE ORAL at 06:52

## 2020-10-05 RX ADMIN — FENTANYL CITRATE 25 MCG: 50 INJECTION, SOLUTION INTRAMUSCULAR; INTRAVENOUS at 10:36

## 2020-10-05 RX ADMIN — TRANEXAMIC ACID 1 G: 1 INJECTION, SOLUTION INTRAVENOUS at 09:18

## 2020-10-05 RX ADMIN — Medication 10 MG: at 09:07

## 2020-10-05 RX ADMIN — DEXAMETHASONE SODIUM PHOSPHATE 5 MG: 4 INJECTION, SOLUTION INTRAMUSCULAR; INTRAVENOUS at 07:53

## 2020-10-05 NOTE — PROGRESS NOTES
Problem: Mobility Impaired (Adult and Pediatric)  Goal: *Acute Goals and Plan of Care (Insert Text)  Description: Physical Therapy Goals  Initiated 10/5/2020   1. Patient will move from supine to sit and sit to supine , scoot up and down, and roll side to side in bed with independence. 2.  Patient will transfer from bed to chair and chair to bed with independence using the least restrictive device. 3.  Patient will perform sit to stand with independence. 4.  Patient will ambulate with supervision/set-up for 500 feet with the least restrictive device. 5.  Patient will ascend/descend 11 stairs with bilateral handrail(s) with modified independence. PLOF: Pt was an independent community ambulator w/o use of an AD. Outcome: Resolved/Met   PHYSICAL THERAPY EVALUATION AND DISCHARGE    Patient: Romy Reyna (89 y.o. female)  Date: 10/5/2020  Primary Diagnosis: Osteoarthritis of right knee, unspecified osteoarthritis type [M17.11]  Knee osteoarthritis [M17.10]  Procedure(s) (LRB):  RIGHT TOTAL KNEE ARTHROPLASTY (Right) Day of Surgery   Precautions: RLE WBAT    ASSESSMENT :  Pt is independent with supine to sit and sit to stand. She reported some numbness in both feet and soreness in the R knee, but agreed to ambulate. She walked 500' alternating between a rolling walker, straight cane, and no assistive device with stand by assist. She ascended 11 steps with a reciprocating pattern and descended the 11 steps with a step to pattern. No SOB or dizziness reported while ambulating. She has family members around the area that can assist her if need be, but prior to the procedure she was independent with all ADL's. Patient does not require further skilled intervention at this level of care. PLAN :  Recommendations and Planned Interventions:   No formal PT needs identified at this time. Discharge Recommendations: D/C to home.   Further Equipment Recommendations for Discharge: Pt to  RW and cane on the way home     SUBJECTIVE:   Patient stated i'll walk the best that I can.     OBJECTIVE DATA SUMMARY:     Past Medical History:   Diagnosis Date    Dyslipidemia with elevated low density lipoprotein (LDL) cholesterol and abnormally low high density lipoprotein cholesterol     Essential hypertension 9/25/2020    GERD (gastroesophageal reflux disease)     Hypertension      Past Surgical History:   Procedure Laterality Date    FOOT/TOES SURGERY PROC UNLISTED      HX ORTHOPAEDIC      foot surgery     Barriers to Learning/Limitations: None  Compensate with: N/A  Home Situation:   Home Situation  Home Environment: Private residence  # Steps to Enter: 3  Rails to Enter: Yes  Hand Rails : Bilateral  One/Two Story Residence: One story  Living Alone: Yes  Support Systems: Family member(s)  Patient Expects to be Discharged to[de-identified] Private residence  Critical Behavior:  Neurologic State: Alert  Orientation Level: Appropriate for age;Oriented X4  Cognition: Follows commands; Appropriate decision making     Psychosocial  Patient Behaviors: Calm   Strength:    Strength: Generally decreased, functional   RUE:  LUE:  RLE: Good  LLE:  SLR at 1309, which was 5 hours and 3 minutes after spinal block. Tone & Sensation:    Sensation: Intact   Range Of Motion:   AROM: Generally decreased, functional   RUE:  LUE:  RLE: Lacks 8 degrees of R knee extension. Has 85 degrees of knee flexion AROM and 98 degrees of knee flexion PROM.    LLE:  Posture:  Posture (WDL): Within defined limits    Functional Mobility:  Bed Mobility:  Rolling: Independent  Supine to Sit: Independent  Sit to Supine: Independent  Scooting: Independent  Transfers:  Sit to Stand: Independent  Stand to Sit: Independent   Balance:   Sitting: Intact  Standing: Intact  Ambulation/Gait Training:  Distance (ft): 500 Feet (ft)  Assistive Device: Walker, rolling;Cane, straight;Other (comment)(No AD)  Ambulation - Level of Assistance: Stand-by assistance  Right Side Weight Bearing: As tolerated   Stairs:  Number of Stairs Trained: 11  Stairs - Level of Assistance: Contact guard assistance  Rail Use: Both   Ascended 11 steps using reciprocating pattern              Descended 11 steps using step to pattern    Pain:  Some soreness reported but no pain. Activity Tolerance:   Pt tolerated ambulation well w/o any complaints. Please refer to the flowsheet for vital signs taken during this treatment. After treatment:   []         Patient left in no apparent distress sitting up in chair  [x]         Patient left in no apparent distress in bed  [x]         Call bell left within reach  []         Nursing notified  []         Caregiver present  []         Bed alarm activated  []         SCDs applied    COMMUNICATION/EDUCATION:   []         Role of Physical Therapy in the acute care setting. []         Fall prevention education was provided and the patient/caregiver indicated understanding. []         Patient/family have participated as able in goal setting and plan of care. [x]         Patient/family agree to work toward stated goals and plan of care. []         Patient understands intent and goals of therapy, but is neutral about his/her participation. []         Patient is unable to participate in goal setting/plan of care: ongoing with therapy staff.  []         Other:     Thank you for this referral.  Janina Fonseca, PT, DPT   Time Calculation: 22 mins

## 2020-10-05 NOTE — ANESTHESIA PROCEDURE NOTES
Spinal Block    Start time: 10/5/2020 8:03 AM  End time: 10/5/2020 8:06 AM  Performed by: Danilo Valenzuela CRNA  Authorized by: Danilo Valenzuela CRNA     Pre-procedure: Indications: at surgeon's request and primary anesthetic  Preanesthetic Checklist: patient identified, risks and benefits discussed, anesthesia consent, site marked, patient being monitored and timeout performed    Timeout Time: 08:02          Spinal Block:   Patient Position:  Seated  Prep Region:  Lumbar  Prep: Betadine      Location:  L3-4  Technique:  Single shot    Local Dose (mL):  1.5    Needle:   Needle Type:   Ze  Needle Gauge:  25 G  Attempts:  1      Events: CSF confirmed, no blood with aspiration and no paresthesia        Assessment:  Insertion:  Uncomplicated  Patient tolerance:  Patient tolerated the procedure well with no immediate complications

## 2020-10-05 NOTE — INTERVAL H&P NOTE
Update History & Physical 
 
The Patient's History and Physical of October 5th 2020,  
 was reviewed with the patient and I examined the patient. There was no change. The surgical site was confirmed by the patient and me. Plan:  The risk, benefits, expected outcome, and alternative to the recommended procedure have been discussed with the patient. Patient understands and wants to proceed with the procedure.  
 
Electronically signed by Julius Henley MD on 10/5/2020 at 8:12 AM

## 2020-10-05 NOTE — DISCHARGE INSTRUCTIONS
TOTAL KNEE REPLACEMENT DISCHARGE INFORMATION    You have undergone a Total Knee Replacement. The following list is to provide you with some expectations over the next week upon your discharge from the hospital.     1. Please continue your Aspirin 325mg every 12 hours (twice daily) or any other blood thinner as directed until Dr. Sergo Dominguez or Kiana Wilburn PA-C instruct you to discontinue it. 2. Please be sure to continue your thigh-high compression stockings on both sides until instructed to discontinue them. 3. Over the course of the next week, you should continue BOTH thigh high stockings, DO NOT GET THE INCISION WET until instructed to do so. 4. During the course of your dressing changes over the next week, should you experience fevers of 101.5 F, a white drainage from the incision, extreme redness around the incision, or the incision begins to have a pungent smell; Please call our office or page Dr. Sergo Dominguez or BERONICA Wilburn whose numbers are provided in your discharge paperwork. To Page Dr. Sergo Dominguez or Gianni Graham please call [phone] and dial [phone]. Have the  page whomever is on call for Orthopedics. These are signs of infection and it should be addressed immediately. 5. Please do not drive until instructed to do so. 6. It is very important for you to begin your Outpatient Physical Therapy within a couple days (of the day of your discharge) and your appointment has been set up. Details provided in a separate sheet. 7. Remove ace wrap in 48 hrs after surgery but keep stockings on.  8. Finish all antibiotics     Phone: [phone]    POLAR CARE INSTRUCTIONS:     DAY 1-3 [address]  INSPECT SKIN EVERY 1-2 HOURS   MAKE SURE YOU PLACE A TOWEL IN BETWEEN SKIN AND POLAR PAD.  DAY 4 AND ON USE AS NEEDED FOR PAIN CONTROL FOR 1 HOUR INTERVALS; NOT TO EXCEED 12 HOURS/DAY. [email or web address]

## 2020-10-05 NOTE — ANESTHESIA PROCEDURE NOTES
Peripheral Block    Start time: 10/5/2020 7:50 AM  End time: 10/5/2020 7:53 AM  Performed by: Patty Carranza CRNA  Authorized by: Patty Carranza CRNA       Pre-procedure: Indications: at surgeon's request and post-op pain management    Preanesthetic Checklist: patient identified, risks and benefits discussed, site marked, timeout performed, anesthesia consent given and patient being monitored    Timeout Time: 07:48          Block Type:   Block Type:   Adductor canal  Laterality:  Right  Monitoring:  Standard ASA monitoring, continuous pulse ox, frequent vital sign checks, heart rate, responsive to questions and oxygen  Injection Technique:  Single shot  Procedures: ultrasound guided    Patient Position: supine  Prep: chlorhexidine    Location:  Mid thigh  Needle Type:  Ultraplex  Needle Gauge:  22 G  Needle Localization:  Ultrasound guidance  Medication Injected:  Bupivacaine (PF) (MARCAINE) 0.5% injection, 25 mL  dexamethasone (DECADRON) 4 mg/mL injection, 5 mg  Med Admin Time: 10/5/2020 7:53 AM    Assessment:  Number of attempts:  1  Injection Assessment:  Incremental injection every 5 mL, local visualized surrounding nerve on ultrasound, negative aspiration for blood, no intravascular symptoms, no paresthesia and ultrasound image on chart  Patient tolerance:  Patient tolerated the procedure well with no immediate complications

## 2020-10-05 NOTE — ANESTHESIA PREPROCEDURE EVALUATION
Relevant Problems   No relevant active problems       Anesthetic History   No history of anesthetic complications            Review of Systems / Medical History  Patient summary reviewed, nursing notes reviewed and pertinent labs reviewed    Pulmonary  Within defined limits                 Neuro/Psych   Within defined limits           Cardiovascular    Hypertension              Exercise tolerance: >4 METS     GI/Hepatic/Renal     GERD           Endo/Other        Morbid obesity and arthritis    Comments: Osteoarthritis Right Knee Other Findings            Physical Exam    Airway  Mallampati: II  TM Distance: 4 - 6 cm  Neck ROM: normal range of motion   Mouth opening: Normal     Cardiovascular    Rhythm: regular  Rate: normal         Dental  No notable dental hx       Pulmonary  Breath sounds clear to auscultation               Abdominal         Other Findings            Anesthetic Plan    ASA: 3  Patient did not consent to regional anesthesiaAnesthesia type: spinal, general - backup and regional - saphenous block            Anesthetic plan and risks discussed with: Patient

## 2020-10-05 NOTE — ANESTHESIA POSTPROCEDURE EVALUATION
Procedure(s):  RIGHT TOTAL KNEE ARTHROPLASTY.     spinal, general - backup, regional    Anesthesia Post Evaluation      Multimodal analgesia: multimodal analgesia used between 6 hours prior to anesthesia start to PACU discharge  Patient location during evaluation: bedside  Patient participation: complete - patient participated  Level of consciousness: awake and alert  Pain score: 0  Pain management: adequate  Airway patency: patent  Anesthetic complications: no  Cardiovascular status: acceptable  Respiratory status: acceptable  Hydration status: acceptable  Post anesthesia nausea and vomiting:  none  Final Post Anesthesia Temperature Assessment:  Normothermia (36.0-37.5 degrees C)      INITIAL Post-op Vital signs:   Vitals Value Taken Time   /64 10/5/2020 10:07 AM   Temp 36.3 °C (97.4 °F) 10/5/2020 10:07 AM   Pulse 84 10/5/2020 10:07 AM   Resp 9 10/5/2020 10:07 AM   SpO2

## 2020-10-05 NOTE — OP NOTES
Operative Note    Patient: Mckenna Godinez MRN: [id number]  Surgery Date: 10/5/2020  [unfilled]          Procedure  Primary Surgeon    RIGHT TOTAL KNEE ARTHROPLASTY  Tao Gil MD    * Panel 2 does not exist *  * Panel 2 does not exist *    * Panel 3 does not exist *  * Panel 3 does not exist *     Surgeon(s) and Role:     * Tao Gil MD - Primary    Other OR Staff/Assistants:  Circ-1: Mya Velazquez  Scrub Tech-1: Eileenkevin Jackson  Surg Asst-1: Milton Steele    1st Assistant Tasks:  Closing    Pre-operative Diagnosis:  Osteoarthritis of right knee, unspecified osteoarthritis type [M17.11]    Post-operative Diagnosis: same as preop diagnosis    Anesthesia Type: General     Findings: djd    Complications: No    EBL: 50 cc    Specimens: None    Implants     Cement    Smartset Ghv - Implanted   (Right) Knee    Lot number: [id number]      As of 10/5/2020     Status: Implanted                  Implant    Component Pat Tuc54ly Knee Poly Dome Bryce Medialized Attune - [id number] - Implanted   (Right) Knee    Inventory item: COMPONENT PAT [id number] KNEE POLY DOME BRYCE MEDIALIZED ATTUNE Model/Cat number: [id number]    : Venetta Rana [employer] Lot number: [id number]    As of 10/5/2020     Status: Implanted                  Insert Tib Sz 5 Thk7mm Knee Post Stbl Rot Platfrm Attune - [id number] - Implanted   (Right) Knee    Inventory item: INSERT TIB SZ 5 THK7MM KNEE POST STBL ROT PLATFRM ATTUNE Model/Cat number: [id number]    : Venetta Rana [employer] Lot number: [id number]    As of 10/5/2020     Status: Implanted                  Baseplate Tib Sz 4 Rot Platfrm Co Chrom Molybdenum Ti Ally - [id number] - Implanted   (Right) Knee    Inventory item: BASEPLATE TIB SZ 4 ROT PLATFRM CO CHROM MOLYBDENUM TI ALLY Model/Cat number: [id number]    : Venetta Rana [employer] Lot number: [id number]    As of 10/5/2020     Status: Implanted                  Component Fem Sz 5 R Knee Anne-Marie Post Stbl Bryce Attune - [id number] - Implanted   (Right) Knee    Inventory item: COMPONENT FEM SZ 5 R KNEE ANNE-MARIE POST STBL BRYCE ATTUNE Model/Cat number: [id number]    : Zach Ribeiro [employer] Lot number: [id number]    As of 10/5/2020     Status: Implanted                         OPERATIVE PROCEDURE:  Please note the first assistant role was to help in patient positioning and draping of the extremity in a sterile fashion. Also during the surgery the assistant's responsibilities included but not limited to extremity positioning during critical portions of the surgery. Assisting in using and placement of retractors during surgery. Lower extremity was prepped and draped in a sterile fashion. After adequate anesthesia was given, the patient was placed in a well-padded supine position. Subvastus arthrotomy from the tibial tubercle to the superior pole of the patella was made. Knee was hyperflexed. Intramedullary reaming of distal femur and proximal tibia was performed. 10 mm of distal femur was cut. Anterior-posterior sizing guide was used. Anterior, posterior, chamfer cuts, and box cuts were made next. Proximal tibial cut and preparation performed. Posterior osteophyte meniscal remnants were removed, and also patella was everted. Free-hand cut of the patella was made. Trial components were placed. The patient was found to have excellent range of motion and stability with all trial components. All the trial components removed. Copious irrigation performed. Distal femur, proximal tibia, and patella were impacted in place. Excessive cement was removed. After the cement was hard, Subvastus arthrotomy closed with Vicryl and Prineo stitch. Compressive dressing was applied. The patient was taken to PACU in stable condition.         Please note due to the patient's BMI of greater than 35 significant surgical effort was required compared to the standard patient with a BMI lower than 35. Surgical time increased approximately 20% from the normal surgical time due to the patient's high BMI.       Kristi Banegas MD

## 2020-10-05 NOTE — PERIOP NOTES
Pt. Assisted up with physical therapy using the walker to bathroom to void without difficulty. Pt now in hallway walking with walker.

## 2020-10-06 ENCOUNTER — HOSPITAL ENCOUNTER (OUTPATIENT)
Dept: PHYSICAL THERAPY | Age: 70
Discharge: HOME | End: 2020-10-06
Payer: MEDICARE

## 2020-10-06 PROCEDURE — 97161 PT EVAL LOW COMPLEX 20 MIN: CPT

## 2020-10-06 PROCEDURE — 97110 THERAPEUTIC EXERCISES: CPT

## 2020-10-06 PROCEDURE — 97016 VASOPNEUMATIC DEVICE THERAPY: CPT

## 2020-10-06 NOTE — PROGRESS NOTES
PT DAILY TREATMENT NOTE/KNEE EVAL 10-18    Patient Name: Hardeep Avila  Date:10/6/2020  : 1950  [x]  Patient  Verified  Payor: Sarah Fleming / Plan: [address] / Product Type: Managed Care Medicare /    In time:1318  Out time:1422  Total Treatment Time (min): 59  Visit #: 1     Medicare/BCBS Only   Total Timed Codes (min):  12 1:1 Treatment Time:  64     Treatment Area: Unilateral primary osteoarthritis, right knee [M17.11]    SUBJECTIVE  Pt presents today s/p R TKA with RW after persistent pain for several years. Pt was an independent ambulator prior to surgery and denies any recent falls. Pt has help at home to don/doff ice pack and lower body dressing, transportation. Pt lives in one story home with 4 steps to enter with railings to hold. Pt reports having some difficulty with positioning to sleep, reports some increased stiffness today compared to yesterday when coming home from surgery.      Pt. Goals: \"to be able to get back walking for exercise\"    Pain Level (0-10 scale): Current 0/10   Worst: 7/10 with bending  []constant [x]intermittent []improving []worsening []no change since onset      PMH: Arthritis, Depression, HTN, R bunionectomy ()      Any medication changes, allergies to medications, adverse drug reactions, diagnosis change, or new procedure performed?: [x] No    [] Yes (see summary sheet for update)          OBJECTIVE/EXAMINATION  Palpation: gross palpatory tenderness along R knee, posterior musculature, intact to light touch; dressing clean and dry         ROM / Strength  [] Unable to assess                    AROM                         PROM                     Strength     Left Right Left Right Left Right   Hip Flexion     5/5 3/5    Extension          Abduction          Adduction         Knee Flexion 120 93  100 5/5 4/5    Extension 0 -15  -10 5/5 3+/5   Ankle Plantarflexion     5/5 5/5    Dorsiflexion     5/5 5/5       Patellar Mobility   []L []R Hypermobile  []L [x]R Hypomobile           Girth Measurements:     Midpatellar:              Left 40.0 cm     Right 44.4 cm                        Gait:  [] Normal    [] Abnormal    [x] Antalgic    [] NWB    Device: RW> no AD    Distance: 400 feet  Comments: Step negotiation: reciprocal ascent, step-to pattern descent, bilateral handrails        Balance: Static stance: good;  Dynamic stance: fair    Other tests/comments: Timed Up and Go: 16 seconds, no AD       Modality rationale: decrease inflammation and decrease pain to improve the patients ability to improve tissue healing, mobility   Min Type Additional Details    [] Estim:  []Unatt       []IFC  []Premod                        []Other:  []w/ice   []w/heat  Position:  Location:    [] Estim: []Att    []TENS instruct  []NMES                    []Other:  []w/US   []w/ice   []w/heat  Position:  Location:         []  Ultrasound: []Continuous   [] Pulsed                           []1MHz   []3MHz Location:  W/cm2:         []  Ice     []  heat  []  Ice massage  []  Laser   []  Anodyne Position:  Location:        10 [x]  Vasopneumatic Device, R knee post-session Pressure:       [x] lo [] med [] hi   Temperature: [x] lo [] med [] hi     42 min [x]Eval                  []Re-Eval       12 min Therapeutic Exercise:  [x] See flow sheet :   Rationale: increase ROM and increase strength to improve the patients ability to restore PLOF        With   [x] TE   [] TA   [] neuro   [] other: Patient Education: [x] Review HEP    [] Progressed/Changed HEP based on:   [] positioning   [] body mechanics   [] transfers   [x] ice in heel prop position  [x] other: HEP review       Pain Level (0-10 scale) post treatment: 0/10                PLAN  [x]  See plan of care   []  Discharge due to:_  []  Other:_      Jhon Blackman PT DPMARCIA  10/6/2020  1:05 PM

## 2020-10-06 NOTE — PROGRESS NOTES
[address] [facility] [place], [address] [facility], [address] [place] [address]  PLAN OF CARE / [facility]  Patient Name: Gamaliel Ramirez : 1950   Medical   Diagnosis: Unilateral primary osteoarthritis, right knee [M17.11] Treatment Diagnosis: R knee pain   Onset Date: 10/5/2020     Referral Source: Bob Dial MD [facility]): 10/6/2020   Prior Hospitalization: See medical history Provider #: [id number]   Prior Level of Function: Independent without AD   Comorbidities: Arthritis, Depression, HTN   Medications: Verified on Patient Summary List   The Plan of Care and following information is based on the information from the initial evaluation.   ==========================================================================================  Assessment / Functional Analysis:    Pt is a 71y.o. year old  female who presents to outpatient clinic today POD#1 R TKA with RW. Pt presents with impairments that include decreased R knee P/AROM, right knee and hip weakness, right knee edema, difficulty walking and intermittent pain limiting function.  Improvements in stability noted with repetition, no AD used within clinic today on level surfaces.     ==========================================================================================  Eval Complexity: History: MEDIUM  Complexity : 1-2 comorbidities / personal factors will impact the outcome/ POC Exam:MEDIUM Complexity : 3 Standardized tests and measures addressing body structure, function, activity limitation and / or participation in recreation  Presentation: LOW Complexity : Stable, uncomplicated  Clinical Decision Making:Overall Complexity:LOW     Problem List: pain affecting function, decrease ROM, decrease strength, edema affecting function, impaired gait/ balance, decrease ADL/ functional abilitiies, decrease activity tolerance and decrease flexibility/ joint mobility   Treatment Plan may include any combination of the following: Therapeutic exercise, Neuromuscular re-education, Physical agent/modality, Gait/balance training, Manual therapy, Patient education, Functional mobility training and Stair training  Patient / Family readiness to learn indicated by: asking questions and interest  Persons(s) to be included in education: patient (P)  Barriers to Learning/Limitations: None      Patient self reported health status: good  Rehabilitation Potential: good      Short Term Goals:  1. Pt will be independent with HEP to improve R knee ROM and strength in 3 weeks. 2. Pt will improve R knee PROM to 0-120 degrees for improved ADL efficiency in 3 weeks. 3. Pt will perform TUG in <14 seconds for improved household mobility in 3 weeks. Long Term Goals:  1. Pt will improve R knee AROM to 0-120 degrees for improved ability to perform household chores requiring bending in 6 weeks. 2. Pt will improve R knee and hip strength to 5/5 for improved stability when negotiating steps in/out of home in 6 weeks. 3. Pt will be able to ambulate > 500 feet on level & unlevel surfaces without AD for improved ability return to walking for exercise in 6 weeks. 4. Pt will report no greater than 1-2/10 pain in R knee for improved sleep at night in 6 weeks. Frequency / Duration: Patient to be seen  3  times per week for 6  weeks:  Patient / Caregiver education and instruction: self care and exercises    Therapist Signature: Bindu Alegria PT. DPT Date: 77/3/6148   Certification Period: 10/6/2020  -   12/28/2020 Time: 1:05 PM   ===========================================================================================  I certify that the above Physical Therapy Services are being furnished while the patient is under my care. I agree with the treatment plan and certify that this therapy is necessary.     Physician Signature:        Date:       Time:     Please sign and return to [facility] or you may fax the signed copy to [phone]. Please call [phone] if more information required. Thank you.

## 2020-10-08 ENCOUNTER — HOSPITAL ENCOUNTER (OUTPATIENT)
Dept: PHYSICAL THERAPY | Age: 70
Discharge: HOME | End: 2020-10-08
Payer: MEDICARE

## 2020-10-08 PROCEDURE — 97016 VASOPNEUMATIC DEVICE THERAPY: CPT

## 2020-10-08 PROCEDURE — 97110 THERAPEUTIC EXERCISES: CPT

## 2020-10-08 NOTE — PROGRESS NOTES
PT DAILY TREATMENT NOTE     Patient Name: Abelino Lundy  Date:10/8/2020  : 1950  [x]  Patient  Verified  Payor: Tino Motta / Plan: [address] / Product Type: Managed Care Medicare /    In time:1129  Out time:1229  Total Treatment Time (min): 60  Total Timed Codes (min): 40  1:1 Treatment Time (min): 40       Treatment Area: Unilateral primary osteoarthritis, right knee [M17.11]    SUBJECTIVE  Pt enters today without AD, states that she is sleeping better & took a few Tylenol prior to visit.   Pain Level (0-10 scale): 0/10  Any medication changes, allergies to medications, adverse drug reactions, diagnosis change, or new procedure performed?: [x] No    [] Yes (see summary sheet for update)        OBJECTIVE  Modality rationale: decrease inflammation and increase tissue extensibility to improve the patients mobility & healing   Min Type Additional Details    [] Estim: []Att   []Unatt  []TENS instruct                 []IFC  []Premod []NMES                       []Other:  []w/US   []w/ice   []w/heat  Position:  Location:    []  Traction: [] Cervical       []Lumbar                       [] Prone          []Supine                       []Intermittent   []Continuous Lbs:  [] before manual  [] after manual    []  Ultrasound: []Continuous   [] Pulsed                           []1MHz   []3MHz Location:  W/cm2:        10 []  Ice     [x]  heat  []  Ice massage Position: seated w/ heel prop Location: R knee   10 [x]  Vasopneumatic Device Pressure: [x] lo [] med [] hi   Temp: [x] lo [] med [] hi       40 min Therapeutic Exercise:  [x] See flow sheet :   Rationale: increase ROM, increase strength, improve balance and quadriceps activation & endurance to improve the patients ability to move with less pain & promote healing and restoration of PLOF  Session initiated with MHP to R knee followed by active warm up on stepper to promote muscular & cardiovascular endurance. Initiated of POC to include self stretching and PRE in standing, supine & sitting. Cues provided as needed to ensure proper form, quad engagement and posture. With TE Patient Education: [x] Review HEP    [] Progressed/Changed HEP based on:   [] positioning   [] body mechanics   [] transfers   [x] heat/ice application          Pain Level (0-10 scale) post treatment: 0/10    ASSESSMENT/Changes in Function: Pt demonstrates improved recall with HEP, plan to continue focus on quadriceps activation and progression of CKC. Patient will continue to benefit from skilled PT services to modify and progress therapeutic interventions, address functional mobility deficits, address ROM deficits, address strength deficits and analyze and cue movement patterns to attain remaining goals.      [x]  See Plan of Care  []  See progress note/recertification  []  See Discharge Summary             PLAN  [x]  Upgrade activities as tolerated     [x]  Continue plan of care  []  Update interventions per flow sheet       []  Discharge due to:_  []  Other:_      Hernandez Josesito PT, DPT 10/8/2020  11:46 AM

## 2020-10-09 ENCOUNTER — HOSPITAL ENCOUNTER (OUTPATIENT)
Dept: PHYSICAL THERAPY | Age: 70
Discharge: HOME | End: 2020-10-09
Payer: MEDICARE

## 2020-10-09 PROCEDURE — 97016 VASOPNEUMATIC DEVICE THERAPY: CPT

## 2020-10-09 PROCEDURE — 97110 THERAPEUTIC EXERCISES: CPT

## 2020-10-09 NOTE — PROGRESS NOTES
PT DAILY TREATMENT NOTE 8    Patient Name: Bebeto Evans  Date:10/9/2020  : 1950  [x]  Patient  Verified  Payor: UNITED HEALTHCARE MEDICARE / Plan: [address] / Product Type: Managed Care Medicare /    In time:1007  Out time:1128  Total Treatment Time (min): 71  Total Timed Codes (min): 51  1:1 Treatment Time (min): 51       Treatment Area: Unilateral primary osteoarthritis, right knee [M17.11]    SUBJECTIVE  Pt denies complaints upon arrival today, admits to some stiffness in knee post-session & in early mornings.    Pain Level (0-10 scale): 0/10  Any medication changes, allergies to medications, adverse drug reactions, diagnosis change, or new procedure performed?: [x] No    [] Yes (see summary sheet for update)        OBJECTIVE  Modality rationale: decrease inflammation and increase tissue extensibility to improve the patients ability to move & participate optimally    Min Type Additional Details    [] Estim: []Att   []Unatt  []TENS instruct                 []IFC  []Premod []NMES                       []Other:  []w/US   []w/ice   []w/heat  Position:  Location:    []  Traction: [] Cervical       []Lumbar                       [] Prone          []Supine                       []Intermittent   []Continuous Lbs:  [] before manual  [] after manual    []  Ultrasound: []Continuous   [] Pulsed                           []1MHz   []3MHz Location:  W/cm2:        10 []  Ice     [x]  heat  []  Ice massage Position: seated w/ heel prop   Location: R knee   10 [x]  Vasopneumatic Device Pressure: [] lo [x] med [] hi   Temp: [x] lo [] med [] hi         51 Min Therapeutic Exercise:  [x] See flow sheet :   Rationale: increase ROM, increase strength, improve balance and promote quadriceps activatin & endurance to improve the patients ability to maximize pain-free daily activity & restoration of PLOF  Session initiated with MHP to R knee followed by self stretching and standing PRE with pt demonstrating good recall. PRE progressed today to include TKE and step ups with UE support for balance. Pt provided with demonstration for all new activities. With TE Patient Education: [x] Review HEP    [] Progressed/Changed HEP based on:   [] positioning   [] body mechanics   [] transfers   [x] heat/ice application          Pain Level (0-10 scale) post treatment: 0/10    ASSESSMENT/Changes in Function: Improvements in ROM measured today following AAROM and manual LAD and pump handle technique: AROM: -10 -101 degrees and PROM: -5 - 110 degrees. Plan to further address end range deficits & promote function next week. Patient will continue to benefit from skilled PT services to modify and progress therapeutic interventions, address functional mobility deficits, address ROM deficits, address strength deficits and assess and modify postural abnormalities to attain remaining goals.      [x]  See Plan of Care  []  See progress note/recertification  []  See Discharge Summary             PLAN  [x]  Upgrade activities as tolerated     [x]  Continue plan of care  []  Update interventions per flow sheet       []  Discharge due to:_  []  Other:_      Viviana Velez, PT, DPT 10/9/2020  10:14 AM

## 2020-10-12 ENCOUNTER — HOSPITAL ENCOUNTER (OUTPATIENT)
Dept: PHYSICAL THERAPY | Age: 70
Discharge: HOME | End: 2020-10-12
Payer: MEDICARE

## 2020-10-12 PROCEDURE — 97016 VASOPNEUMATIC DEVICE THERAPY: CPT

## 2020-10-12 PROCEDURE — 97110 THERAPEUTIC EXERCISES: CPT

## 2020-10-12 NOTE — PROGRESS NOTES
PT DAILY TREATMENT NOTE 8    Patient Name: Madelin Navarro  Date:10/12/2020  : 1950  [x]  Patient  Verified  Payor: UNITED HEALTHCARE MEDICARE / Plan: [address] / Product Type: Managed Care Medicare /    In time:1003  Out time:1102  Total Treatment Time (min): 59  Total Timed Codes (min): 55  Visit #: 4     Treatment Area: Unilateral primary osteoarthritis, right knee [M17.11]    SUBJECTIVE  Pain Level (0-10 scale): 0  Any medication changes, allergies to medications, adverse drug reactions, diagnosis change, or new procedure performed?: [x] No    [] Yes (see summary sheet for update)  Subjective functional status/changes:   [] No changes reported  Chief complaint is stiffness and tightness in R knee.     OBJECTIVE  Modality rationale: decrease inflammation and decrease pain to improve the patients ability to remain pain free    Min Type Additional Details    [] Estim: []Att   []Unatt  []TENS instruct                 []IFC  []Premod []NMES                       []Other:  []w/US   []w/ice   []w/heat  Position:  Location:    []  Traction: [] Cervical       []Lumbar                       [] Prone          []Supine                       []Intermittent   []Continuous Lbs:  [] before manual  [] after manual    []  Ultrasound: []Continuous   [] Pulsed                           []1MHz   []3MHz Location:  W/cm2:    []  Iontophoresis with dexamethasone         Location: [] Take home patch   [] In clinic    []  Ice     []  heat  []  Ice massage Position:  Location:   10 [x]  Vasopneumatic Device Pressure: [] lo [x] med [] hi   Temp: [x] lo [] med [] hi   [] Skin assessment post-treatment:  []intact []redness- no adverse reaction       []redness  adverse reaction:       45 min Therapeutic Exercise:  [x] See flow sheet :   Rationale: increase ROM, increase strength, improve balance and increase proprioception to improve the patients ability to complete stair navigation, exhibit proper gait with ambulation, and enter/exit vehicle pain free. min Patient Education: [x] Review HEP    [] Progressed/Changed HEP based on:   [] positioning   [] body mechanics   [] transfers   [] heat/ice application        Pain Level (0-10 scale) post treatment: 0    ASSESSMENT/Changes in Function: Continued with progressed exercises from previous visit focusing on knee flexion and extension deficits. Light strengthening revisited with verbal and tactile cues given as needed. Extensor lag present with SLR. Patient will continue to benefit from skilled PT services to modify and progress therapeutic interventions, address functional mobility deficits, address ROM deficits, address strength deficits and analyze and address soft tissue restrictions to attain remaining goals.      [x]  See Plan of Care  []  See progress note/recertification  []  See Discharge Summary          PLAN  []  Upgrade activities as tolerated     [x]  Continue plan of care  []  Update interventions per flow sheet       []  Discharge due to:_  []  Other:_      BOOM Luque  10/12/2020  1:21 PM

## 2020-10-13 ENCOUNTER — HOSPITAL ENCOUNTER (OUTPATIENT)
Dept: PHYSICAL THERAPY | Age: 70
Discharge: HOME | End: 2020-10-13
Payer: MEDICARE

## 2020-10-13 PROCEDURE — 97016 VASOPNEUMATIC DEVICE THERAPY: CPT

## 2020-10-13 PROCEDURE — 97110 THERAPEUTIC EXERCISES: CPT

## 2020-10-13 NOTE — PROGRESS NOTES
PT DAILY TREATMENT NOTE     Patient Name: Genoveva Ferris  Date:10/13/2020  : 1950  [x]  Patient  Verified  Payor: [employer] MEDICARE / Plan: [address] / Product Type: Managed Care Medicare /    In LPKW:[id number]  Out time:1048  Total Treatment Time (min): 54  Total Timed Codes (min): 44  1:1 Treatment Time (min): 47   Visit #: 5    Treatment Area: Unilateral primary osteoarthritis, right knee [M17.11]    SUBJECTIVE  Pain Level (0-10 scale): 0  Any medication changes, allergies to medications, adverse drug reactions, diagnosis change, or new procedure performed?: [x] No    [] Yes (see summary sheet for update)  Subjective functional status/changes:   [] No changes reported  Pt. Reports a sharp shooting pain in R knee. OBJECTIVE  Modality rationale: decrease inflammation and decrease pain to improve the patients ability to    Min Type Additional Details    [] Estim: []Att   []Unatt  []TENS instruct                 []IFC  []Premod []NMES                       []Other:  []w/US   []w/ice   []w/heat  Position:  Location:    []  Traction: [] Cervical       []Lumbar                       [] Prone          []Supine                       []Intermittent   []Continuous Lbs:  [] before manual  [] after manual    []  Ultrasound: []Continuous   [] Pulsed                           []1MHz   []3MHz Location:  W/cm2:    []  Iontophoresis with dexamethasone         Location: [] Take home patch   [] In clinic    []  Ice     []  heat  []  Ice massage Position:  Location:   10 [x]  Vasopneumatic Device Pressure: [] lo [x] med [] hi   Temp: [] lo  med [] hi   [] Skin assessment post-treatment:  []intact []redness- no adverse reaction       []redness  adverse reaction:       44 min Therapeutic Exercise:  [] See flow sheet :   Rationale: increase ROM, increase strength and improve balance to allow a pain free return to PLOF.              min Patient Education: [x] Review HEP    [] Progressed/Changed HEP based on:   [] positioning   [] body mechanics   [] transfers   [] heat/ice application        Pain Level (0-10 scale) post treatment: 0    ASSESSMENT/Changes in Function: Increased repetitions and resistance to various exercises to challenge quadriceps endurance and motor control. Focused on increasing range of motion, specifically extension. Increased timing with quad sets while incorporating supine static extension stretch. Pt. Johnny List to progress well through TKA protocol. Patient will continue to benefit from skilled PT services to modify and progress therapeutic interventions, address functional mobility deficits, address ROM deficits, address strength deficits and analyze and address soft tissue restrictions to attain remaining goals.      [x]  See Plan of Care  []  See progress note/recertification  []  See Discharge Summary          PLAN  []  Upgrade activities as tolerated     [x]  Continue plan of care  []  Update interventions per flow sheet       []  Discharge due to:_  []  Other:_      BOOM Luque  10/13/2020  10:50 AM

## 2020-10-15 ENCOUNTER — HOSPITAL ENCOUNTER (OUTPATIENT)
Dept: PHYSICAL THERAPY | Age: 70
Discharge: HOME | End: 2020-10-15
Payer: MEDICARE

## 2020-10-15 PROCEDURE — 97016 VASOPNEUMATIC DEVICE THERAPY: CPT

## 2020-10-15 PROCEDURE — 97110 THERAPEUTIC EXERCISES: CPT

## 2020-10-20 ENCOUNTER — HOSPITAL ENCOUNTER (OUTPATIENT)
Dept: PHYSICAL THERAPY | Age: 70
Discharge: HOME | End: 2020-10-20
Payer: MEDICARE

## 2020-10-20 ENCOUNTER — OFFICE VISIT (OUTPATIENT)
Dept: ORTHOPEDIC SURGERY | Age: 70
End: 2020-10-20
Payer: MEDICARE

## 2020-10-20 DIAGNOSIS — M17.11 OSTEOARTHRITIS OF RIGHT KNEE, UNSPECIFIED OSTEOARTHRITIS TYPE: Primary | ICD-10-CM

## 2020-10-20 PROCEDURE — 97016 VASOPNEUMATIC DEVICE THERAPY: CPT

## 2020-10-20 PROCEDURE — 97110 THERAPEUTIC EXERCISES: CPT

## 2020-10-20 PROCEDURE — 99024 POSTOP FOLLOW-UP VISIT: CPT | Performed by: ORTHOPAEDIC SURGERY

## 2020-10-20 RX ORDER — TOPIRAMATE 100 MG/1
TABLET, FILM COATED ORAL
COMMUNITY
Start: 2020-08-21

## 2020-10-20 NOTE — PROGRESS NOTES
PT DAILY TREATMENT NOTE     Patient Name: Lucero Uriarte  Date:10/20/2020  : 1950  [x]  Patient  Verified  Payor: Kaila Backer / Plan: [address] / Product Type: Managed Care Medicare /    In time:920  Out time:102  Total Treatment Time (min): 60  Total Timed Codes (min): 50  1:1 Treatment Time (min): 50       Treatment Area: Unilateral primary osteoarthritis, right knee [M17.11]    SUBJECTIVE  Pt reports having a good follow-up with surgeon prior to arrival today and is cleared until next year. Pt was also cleared out of KARY hose and is looking forward to showering.   Pain Level (0-10 scale): 0/10  Any medication changes, allergies to medications, adverse drug reactions, diagnosis change, or new procedure performed?: [x] No    [] Yes (see summary sheet for update)        OBJECTIVE  Modality rationale: decrease inflammation to improve the pt's ability to heal optimally and walk best   Min Type Additional Details    [] Estim: []Att   []Unatt  []TENS instruct                 []IFC  []Premod []NMES                       []Other:  []w/US   []w/ice   []w/heat  Position:  Location:    []  Traction: [] Cervical       []Lumbar                       [] Prone          []Supine                       []Intermittent   []Continuous Lbs:  [] before manual  [] after manual    []  Ultrasound: []Continuous   [] Pulsed                           []1MHz   []3MHz Location:  W/cm2:         []  Ice     []  heat  []  Ice massage Position:  Location:   10 []  Vasopneumatic Device Pressure: [] lo [x] med [] hi   Temp: [x] lo [] med [] hi         50 min Therapeutic Exercise:  [x] See flow sheet :   Rationale: increase ROM, increase strength, improve coordination and improve balance to improve the patients ability to restore functional independence & safety            With TE Patient Education: [x] Review HEP    [] Progressed/Changed HEP based on:   [] positioning   [] body mechanics   [] transfers   [x] heat/ice application          Pain Level (0-10 scale) post treatment: 0/10    ASSESSMENT/Changes in Function: Pt tolerated progression today without complaints. Prone positioning introduced to address rectus femoris tension via self stretching and also to progress quad sets. Pt encouraged to perform both with HEP. Resistance added to hamstring curls and repetitions increased as listed on flow sheet. Plan to progress CKC next visit. Patient will continue to benefit from skilled PT services to modify and progress therapeutic interventions, address functional mobility deficits, address ROM deficits and address strength deficits to attain remaining goals.      [x]  See Plan of Care  []  See progress note/recertification  []  See Discharge Summary             PLAN  [x]  Upgrade activities as tolerated     []  Continue plan of care  []  Update interventions per flow sheet       []  Discharge due to:_  []  Other:_      Louisa Vee, PT, DPT 10/20/2020  9:25 AM

## 2020-10-20 NOTE — PROGRESS NOTES
Name: Duane Zaidi    : 1950  Service Dept: [address] [place] [address] MEDICINE         Chief Complaint   Patient presents with    Surgical Follow-up        Patient's Pharmacies:    [address] [place] [address]  Phone: [phone] Fax: [phone]       There were no vitals taken for this visit. No Known Allergies     Current Outpatient Medications   Medication Sig Dispense Refill    topiramate (TOPAMAX) 100 mg tablet       sulindac (CLINORIL) 200 mg tablet Take 200 mg by mouth two (2) times a day.  pantoprazole (PROTONIX) 40 mg tablet Take 40 mg by mouth daily.  losartan-hydroCHLOROthiazide (HYZAAR) 100-25 mg per tablet Take 1 Tab by mouth daily.  buPROPion XL (WELLBUTRIN XL) 150 mg tablet Take 150 mg by mouth every morning.  simvastatin (ZOCOR) 40 mg tablet Take 40 mg by mouth nightly.  ergocalciferol (Vitamin D2) 1,250 mcg (50,000 unit) capsule Take 50,000 Units by mouth every seven (7) days. Indications: saturday      aspirin delayed-release 81 mg tablet Take 81 mg by mouth daily.  multivitamin (ONE A DAY) tablet Take 1 Tab by mouth daily.  calcium-cholecalciferol, d3, (CALCIUM 600 + D) 600-125 mg-unit tab Take 1 Tab by mouth.           Patient Active Problem List   Diagnosis Code    Essential hypertension I10    Severe obesity ([facility] [address] E66.01    Knee osteoarthritis M17.10        Family History   Problem Relation Age of Onset    Heart Disease Mother     Heart Disease Father     Cancer Father         Social History     Socioeconomic History    Marital status:      Spouse name: Not on file    Number of children: Not on file    Years of education: Not on file    Highest education level: Not on file   Tobacco Use    Smoking status: Never Smoker    Smokeless tobacco: Never Used   Substance and Sexual Activity    Alcohol use: Not Currently        Past Surgical History:   Procedure Laterality Date    FOOT/TOES SURGERY PROC UNLISTED      HX ORTHOPAEDIC      foot surgery        Past Medical History:   Diagnosis Date    Dyslipidemia with elevated low density lipoprotein (LDL) cholesterol and abnormally low high density lipoprotein cholesterol     Essential hypertension 9/25/2020    GERD (gastroesophageal reflux disease)     Hypertension         HPI:   I have reviewed and agree with PFSH and ROS and intake form in chart and the record. Review of Systems:   Patient is a pleasant appearing individual, appropriately dressed, well hydrated, well nourished, who is alert, appropriately oriented for age, and in no acute distress with a normal gait and normal affect who does not appear to be in any significant pain. HPI:  The patient is here with a chief complaint of right knee pain. Status post right total knee replacement on 10/5/2020. Doing well, just a little bit of soreness. Assessment/Plan:  Plan at this point, yearly appointment. Activities as tolerated started, weightbearing started. She has no complaints. We will see her back as needed. If she gets worse, she is to give me a call. Otherwise, yearly appointment. Scribed by Rachel Fletcher LPN as dictated by [facility] [place] MANUELA Lam MD.    Return to Office: Follow-up Information    None             Documentation True and Accepted Lenin Lam MD

## 2020-10-20 NOTE — PATIENT INSTRUCTIONS
Knee Pain or Injury: Care Instructions Your Care Instructions Injuries are a common cause of knee problems. Sudden (acute) injuries may be caused by a direct blow to the knee. They can also be caused by abnormal twisting, bending, or falling on the knee. Pain, bruising, or swelling may be severe, and may start within minutes of the injury. Overuse is another cause of knee pain. Other causes are climbing stairs, kneeling, and other activities that use the knee. Everyday wear and tear, especially as you get older, also can cause knee pain. Rest, along with home treatment, often relieves pain and allows your knee to heal. If you have a serious knee injury, you may need tests and treatment. Follow-up care is a key part of your treatment and safety. Be sure to make and go to all appointments, and call your doctor if you are having problems. It's also a good idea to know your test results and keep a list of the medicines you take. How can you care for yourself at home? · Be safe with medicines. Read and follow all instructions on the label. ? If the doctor gave you a prescription medicine for pain, take it as prescribed. ? If you are not taking a prescription pain medicine, ask your doctor if you can take an over-the-counter medicine. · Rest and protect your knee. Take a break from any activity that may cause pain. · Put ice or a cold pack on your knee for 10 to 20 minutes at a time. Put a thin cloth between the ice and your skin. · Prop up a sore knee on a pillow when you ice it or anytime you sit or lie down for the next 3 days. Try to keep it above the level of your heart. This will help reduce swelling. · If your knee is not swollen, you can put moist heat, a heating pad, or a warm cloth on your knee. · If your doctor recommends an elastic bandage, sleeve, or other type of support for your knee, wear it as directed.  
· Follow your doctor's instructions about how much weight you can put on your leg. Use a cane, crutches, or a walker as instructed. · Follow your doctor's instructions about activity during your healing process. If you can do mild exercise, slowly increase your activity. · Reach and stay at a healthy weight. Extra weight can strain the joints, especially the knees and hips, and make the pain worse. Losing even a few pounds may help. When should you call for help? Call 911 anytime you think you may need emergency care. For example, call if: 
  · You have symptoms of a blood clot in your lung (called a pulmonary embolism). These may include: 
? Sudden chest pain. ? Trouble breathing. ? Coughing up blood. Call your doctor now or seek immediate medical care if: 
  · You have severe or increasing pain.  
  · Your leg or foot turns cold or changes color.  
  · You cannot stand or put weight on your knee.  
  · Your knee looks twisted or bent out of shape.  
  · You cannot move your knee.  
  · You have signs of infection, such as: 
? Increased pain, swelling, warmth, or redness. ? Red streaks leading from the knee. ? Pus draining from a place on your knee. ? A fever.  
  · You have signs of a blood clot in your leg (called a deep vein thrombosis), such as: 
? Pain in your calf, back of the knee, thigh, or groin. ? Redness and swelling in your leg or groin. Watch closely for changes in your health, and be sure to contact your doctor if: 
  · You have tingling, weakness, or numbness in your knee.  
  · You have any new symptoms, such as swelling.  
  · You have bruises from a knee injury that last longer than 2 weeks.  
  · You do not get better as expected. Where can you learn more? Go to http://www.gray.com/ Enter K195 in the search box to learn more about \"Knee Pain or Injury: Care Instructions. \" Current as of: June 26, 2019               Content Version: 12.6 © 5893-8119 [employer]. Care instructions adapted under license by [employer] (which disclaims liability or warranty for this information). If you have questions about a medical condition or this instruction, always ask your healthcare professional. [address] any warranty or liability for your use of this information.

## 2020-10-22 ENCOUNTER — APPOINTMENT (OUTPATIENT)
Dept: PHYSICAL THERAPY | Age: 70
End: 2020-10-22
Payer: MEDICARE

## 2020-10-23 ENCOUNTER — HOSPITAL ENCOUNTER (OUTPATIENT)
Dept: PHYSICAL THERAPY | Age: 70
Discharge: HOME | End: 2020-10-23
Payer: MEDICARE

## 2020-10-23 PROCEDURE — 97016 VASOPNEUMATIC DEVICE THERAPY: CPT

## 2020-10-23 PROCEDURE — 97110 THERAPEUTIC EXERCISES: CPT

## 2020-10-23 NOTE — PROGRESS NOTES
PT DAILY TREATMENT NOTE 8    Patient Name: Xin Bond  Date:10/23/2020  : 1950  [x]  Patient  Verified  Payor: [address] [place] / Plan: [address] / Product Type: Managed Care Medicare /    In MILJ:[id number]  Out time:1034  Total Treatment Time (min): 58  Total Timed Codes (min): 48  1:1 Treatment Time (min): 48       Treatment Area: Unilateral primary osteoarthritis, right knee [M17.11]    SUBJECTIVE  Pt denies complaints of pain upon arrival today, only reports soreness.   Pain Level (0-10 scale): 0/10  Any medication changes, allergies to medications, adverse drug reactions, diagnosis change, or new procedure performed?: [x] No    [] Yes (see summary sheet for update)        OBJECTIVE  Modality rationale: decrease inflammation to improve the patients ability to move best, heal better   Min Type Additional Details    [] Estim: []Att   []Unatt  []TENS instruct                 []IFC  []Premod []NMES                       []Other:  []w/US   []w/ice   []w/heat  Position:  Location:    []  Traction: [] Cervical       []Lumbar                       [] Prone          []Supine                       []Intermittent   []Continuous Lbs:  [] before manual  [] after manual    []  Ultrasound: []Continuous   [] Pulsed                           []1MHz   []3MHz Location:  W/cm2:         []  Ice     []  heat  []  Ice massage Position:  Location:   10 [x]  Vasopneumatic Device Pressure: [x] lo [] med [] hi   Temp: [x] lo [] med [] hi         48 min Therapeutic Exercise:  [x] See flow sheet :   Rationale: increase ROM, increase strength, improve coordination and improve balance to improve the patients ability to restore functional independence & safety           With TE Patient Education: [x] Review HEP    [] Progressed/Changed HEP based on:   [] positioning   [] body mechanics   [] transfers   [] heat/ice application          Pain Level (0-10 scale) post treatment: 0/10    ASSESSMENT/Changes in Function: Progression of CKC today to include bridging and leg press, both performed with adductor squeeze to promote symmetrical weight bearing & core activation. Demonstration and/or verbal cues provided to ensure proper form and positioning. Resistance increased with LAQ and hamstring curls to promote LE strength/endurance, no adverse reaction. Plan to formally reassess next week. Patient will continue to benefit from skilled PT services to modify and progress therapeutic interventions, address functional mobility deficits, address ROM deficits, address strength deficits, analyze and address soft tissue restrictions, analyze and cue movement patterns and analyze and modify body mechanics/ergonomics to attain remaining goals.      [x]  See Plan of Care  []  See progress note/recertification  []  See Discharge Summary             PLAN  [x]  Upgrade activities as tolerated     [x]  Continue plan of care  []  Update interventions per flow sheet       []  Discharge due to:_  []  Other:_      Denise Roland PT, DPT 10/23/2020  9:42 AM

## 2020-10-26 ENCOUNTER — HOSPITAL ENCOUNTER (OUTPATIENT)
Dept: PHYSICAL THERAPY | Age: 70
Discharge: HOME | End: 2020-10-26
Payer: MEDICARE

## 2020-10-26 PROCEDURE — 97110 THERAPEUTIC EXERCISES: CPT

## 2020-10-26 NOTE — PROGRESS NOTES
PT DAILY TREATMENT NOTE 8    Patient Name: Eric Vasquez  Date:10/26/2020  : 1950  [x]  Patient  Verified  Payor: UNITED HEALTHCARE MEDICARE / Plan: [address] / Product Type: Managed Care Medicare /    In BWPT:[id number]  Out time:1035  Total Treatment Time (min): 61  Total Timed Codes (min): 51  1:1 Treatment Time (min): 51       Treatment Area: Unilateral primary osteoarthritis, right knee [M17.11]    SUBJECTIVE  Pt denies complaints upon arrival today, states that she notices herself going up/down steps one step at a time due to habit.   Pain Level (0-10 scale): 0/10  Any medication changes, allergies to medications, adverse drug reactions, diagnosis change, or new procedure performed?: [x] No    [] Yes (see summary sheet for update)        OBJECTIVE  Modality rationale: decrease inflammation to improve the patients ability to promote joint healing   Min Type Additional Details    [] Estim: []Att   []Unatt  []TENS instruct                 []IFC  []Premod []NMES                       []Other:  []w/US   []w/ice   []w/heat  Position:  Location:    []  Traction: [] Cervical       []Lumbar                       [] Prone          []Supine                       []Intermittent   []Continuous Lbs:  [] before manual  [] after manual    []  Ultrasound: []Continuous   [] Pulsed                           []1MHz   []3MHz Location:  W/cm2:        10 [x]  Ice     []  heat  []  Ice massage Position: sitting w/ heel prop  Location: R knee    []  Vasopneumatic Device Pressure: [] lo [] med [] hi   Temp: [] lo [] med [] hi       51 min Therapeutic Exercise:  [x] See flow sheet :   Rationale: increase ROM, increase strength, improve coordination and improve balance to improve the patients ability to function with maximal independence & safety             With TE Patient Education: [x] Review HEP    [] Progressed/Changed HEP based on:   [x] scar massage   [] body mechanics   [] transfers [x] ice application w/ heel prop       Pain Level (0-10 scale) post treatment: 0/10    ASSESSMENT/Changes in Function: Pt seen today for reassessment of LE strength, R knee ROM, functional mobility and HEP review with improvement measured in all areas. Lack of active ROM at end ranges proves to be most limiting to full recovery at this time: prone hang stretch with conversational distraction provided. Progression of CKC including resistance with leg press and introduction of unilateral leg press to promote quad strength & endurance with cues for initiating movement with weight distributed into heel. Patient will continue to benefit from skilled PT services to modify and progress therapeutic interventions, address functional mobility deficits, address ROM deficits, address strength deficits and analyze and address soft tissue restrictions to attain remaining goals.      [x]  See Plan of Care  []  See progress note/recertification  []  See Discharge Summary             PLAN  [x]  Upgrade activities as tolerated     [x]  Continue plan of care  []  Update interventions per flow sheet       []  Discharge due to:_  []  Other:_      Jesse Shepard PT, DPT 10/26/2020  9:46 AM

## 2020-10-26 NOTE — PROGRESS NOTES
Mariangel Terrazas [address], [address] [facility], [address] [place] [address]  Phone: [phone]    Fax: [phone]   Progress Note/CONTINUED PLAN OF CARE for PHYSICAL THERAPY          Patient Name: Dori Olmstead : 1950   Treatment/Medical Diagnosis: Unilateral primary osteoarthritis, right knee [M17.11]   Onset Date: 10/5/2020    Referral Source: Emmanuelle Hernandez MD Start of [facility]): 10/6/2020   Prior Hospitalization: See Medical History Provider #: [id number]   Prior Level of Function: Independent without AD   Comorbidities: Arthritis, Depression, HTN   Medications: Verified on Patient Summary List   Visits from [facility]: 9 Missed Visits: 0     Subjective:   Pt reports ability to complete all tasks that she wishes at this time but continues to have some reluctance with step negotiation & has yet to resume walking for exercise. Objective:   Palpation: minimal palpatory tenderness along incisional borders, decreased sensation also reported; normal tissue healing observed. ROM / Strength  []? Unable to assess                     AROM                   PROM                    Strength       Left Right Left Right Left Right   Hip Flexion         4+/5 4/5     Extension                 Abduction                 Adduction               Knee Flexion 120 116   125 5/5 5/5     Extension 0 -3   0 5/5 5/5   Ankle Plantarflexion         5/5 5/5     Dorsiflexion         5/5 5/5      Other tests/comments: Timed Up and Go: 8.9 seconds, no AD    Assessment/Goals:   Pt is 3 weeks s/p R TKA and has made measurable improvements in R knee ROM, RLE strength, functional mobility efficiency and reduced pain with activity. Lack of full active ROM proves to be most limiting at this time to maximal function. Short Term Goals:  1. Pt will be independent with HEP to improve R knee ROM and strength in 3 weeks. Met.     2. Pt will improve R knee PROM to 0-120 degrees for improved ADL efficiency in 3 weeks. Met.     3. Pt will perform TUG in <14 seconds for improved household mobility in 3 weeks. Met, pt performed TUG 8 seconds faster today than initial measure.      Long Term Goals:  1. Pt will improve R knee AROM to 0-120 degrees for improved ability to perform household chores requiring bending in 6 weeks. Progressing with AROM, as listed above. Pt denies any difficulty with household chores. 2. Pt will improve R knee and hip strength to 5/5 for improved stability when negotiating steps in/out of home in 6 weeks. Partially met, knee 5/5, progressing with hip strength. Pt demonstrates reciprocal step pattern on 6 inch steps without UE support, consecutively. 3. Pt will be able to ambulate > 500 feet on level & unlevel surfaces without AD for improved ability return to walking for exercise in 6 weeks. Progressing, pt able to ambulate >500 feet on various surfaces but has yet to attempt walking to exercise. 4. Pt will report no greater than 1-2/10 pain in R knee for improved sleep at night in 6 weeks. Met, pt reports at most 1/10 pain in R knee and states that sleeping is better. Problem List: decrease ROM, decrease strength, impaired gait/ balance, decrease activity tolerance and decrease flexibility/ joint mobility     Updated Plan of Care:    Treatment Plan to include the following per provider discretion: Therapeutic exercise, Physical agent/modality, Gait/balance training, Manual therapy, Patient education and Functional mobility training    Frequency / Duration:  Patient to be seen   2   times per week for   4-6  weeks      Discharge planning: Pt encouraged to continue with HEP regularly, add scar massage and to be mindful of positioning with ice application to promote knee extension. Anticipate discharge towards progressive HEP next month. If you have any questions/comments please contact us directly at [phone].    Thank you for allowing us to assist in the care of your patient. Therapist Signature: Jhon Blackman PT, DPT Date: 38/71/4338   Certification Period:  Reporting Period: 10/26/2020 -12/28/2020  10/6/2020 - 10/26/2020 Time: 9:47 AM   NOTE TO PHYSICIAN:  PLEASE COMPLETE THE ORDERS BELOW AND FAX TO   [facility]: [phone]. If you are unable to process this request in 24 hours please contact our office: [phone].    ___ I have read the above report and request that my patient continue as recommended.   ___ I have read the above report and request that my patient continue therapy with the following changes/special instructions: ________________________________________________   ___ I have read the above report and request that my patient be discharged from therapy.      Physician Signature:        Date:       Time:

## 2020-10-28 ENCOUNTER — APPOINTMENT (OUTPATIENT)
Dept: PHYSICAL THERAPY | Age: 70
End: 2020-10-28
Payer: MEDICARE

## 2020-10-30 ENCOUNTER — HOSPITAL ENCOUNTER (OUTPATIENT)
Dept: PHYSICAL THERAPY | Age: 70
Discharge: HOME | End: 2020-10-30
Payer: MEDICARE

## 2020-10-30 PROCEDURE — 97016 VASOPNEUMATIC DEVICE THERAPY: CPT

## 2020-10-30 PROCEDURE — 97110 THERAPEUTIC EXERCISES: CPT

## 2020-10-30 NOTE — PROGRESS NOTES
PT DAILY TREATMENT NOTE 8-14    Patient Name: Tamara Mckeon  Date:10/30/2020  : 1950  [x]  Patient  Verified  Payor: [employer] MEDICARE / Plan: [x+1] Drive / Product Type: Managed Care Medicare /    In time:934 Out time:1037  Total Treatment Time (min): 63  Total Timed Codes (min): 63    Visit #: 10    Treatment Area: Unilateral primary osteoarthritis, right knee [M17.11]    SUBJECTIVE  Pain Level (0-10 scale): 0/10  Any medication changes, allergies to medications, adverse drug reactions, diagnosis change, or new procedure performed?: [x] No    [] Yes (see summary sheet for update)  Subjective functional status/changes:   [] No changes reported  Pt arrival no new c/o today. OBJECTIVE  Modality rationale: decrease pain and increase tissue extensibility to improve the patients ability to return to prior activity in a pain free range. Min Type Additional Details    [] Estim: []Att   []Unatt  []TENS instruct                 []IFC  []Premod []NMES                       []Other:  []w/US   []w/ice   []w/heat  Position:  Location:    []  Traction: [] Cervical       []Lumbar                       [] Prone          []Supine                       []Intermittent   []Continuous Lbs:  [] before manual  [] after manual    []  Ultrasound: []Continuous   [] Pulsed                           []1MHz   []3MHz Location:  W/cm2:    []  Iontophoresis with dexamethasone         Location: [] Take home patch   [] In clinic    []  Ice     []  heat  []  Ice massage Position:  Location:   10 [x]  Vasopneumatic Device Pressure: [x] lo [] med [] hi   Temp: [x] lo [] med [] hi     51 min Therapeutic Exercise:  [x] See flow sheet :   Rationale: increase ROM, increase strength and improve balance to improve the patients ability to return to prior level of function. Began session with active warm up on LBE to increase blood flow, and improve ROM, followed by stretches and strengthening. min Patient Education: [x] Review HEP    [] Progressed/Changed HEP based on:   [] positioning   [] body mechanics   [] transfers   [] heat/ice application             Pain Level (0-10 scale) post treatment: 0/10    ASSESSMENT/Changes in Function: Pt appears to be making excellent progress with ROM ,will cont to work on extension deficit. Patient will continue to benefit from skilled PT services to address functional mobility deficits, address ROM deficits and address strength deficits to attain remaining goals.      [x]  See Plan of Care  []  See progress note/recertification  []  See Discharge Summary             PLAN  []  Upgrade activities as tolerated     [x]  Continue plan of care  []  Update interventions per flow sheet       []  Discharge due to:_  []  Other:_      BOOM Russell  10/30/2020  12:49 PM

## 2020-11-02 ENCOUNTER — HOSPITAL ENCOUNTER (OUTPATIENT)
Dept: PHYSICAL THERAPY | Age: 70
Discharge: HOME | End: 2020-11-02
Payer: MEDICARE

## 2020-11-02 PROCEDURE — 97016 VASOPNEUMATIC DEVICE THERAPY: CPT

## 2020-11-02 PROCEDURE — 97110 THERAPEUTIC EXERCISES: CPT

## 2020-11-02 NOTE — PROGRESS NOTES
PT DAILY TREATMENT NOTE 8    Patient Name: Duane Zaidi  Date:2020  : 1950  [x]  Patient  Verified  Payor: [facility] / Plan: [address] / Product Type: Managed Care Medicare /    In time: Out time:171  Total Treatment Time (min):  61  Total Timed Codes (min): 59      Treatment Area: Unilateral primary osteoarthritis, right knee [M17.11]    SUBJECTIVE  Pain Level (0-10 scale): 0/10  Any medication changes, allergies to medications, adverse drug reactions, diagnosis change, or new procedure performed?: [x] No    [] Yes (see summary sheet for update)  Subjective functional status/changes:   [x] No changes reported  Reported difficulty alternating with steps up and down     OBJECTIVE  Modality rationale: decrease edema to improve the patients ability to get increased ROM and have less swelling after exercise. Min Type Additional Details    [] Estim: []Att   []Unatt  []TENS instruct                 []IFC  []Premod []NMES                       []Other:  []w/US   []w/ice   []w/heat  Position:  Location:    []  Traction: [] Cervical       []Lumbar                       [] Prone          []Supine                       []Intermittent   []Continuous Lbs:  [] before manual  [] after manual    []  Ultrasound: []Continuous   [] Pulsed                           []1MHz   []3MHz Location:  W/cm2:    []  Iontophoresis with dexamethasone         Location: [] Take home patch   [] In clinic    []  Ice     []  heat  []  Ice massage Position:  Location:   10 [x]  Vasopneumatic Device Pressure: [] lo [x] med [] hi   Temp: [] lo [] med [x] hi   [x] Skin assessment post-treatment:  []intact [x]redness- no adverse reaction       []redness  adverse reaction:       49 min Therapeutic Exercise:  [] See flow sheet :   Rationale: increase ROM and increase strength to improve the patients ability to ascend and descend steps without difficulty.               min Patient Education: [x] Review HEP    [] Progressed/Changed HEP based on:   [] positioning   [] body mechanics   [] transfers   [] heat/ice application        Other Objective/Functional Measures:     Pain Level (0-10 scale) post treatment: 0/10    ASSESSMENT/Changes in Function: Noted poor eccentric control with descending steps. Started Step downs and worked on step negotiation to improve this. Patient will continue to benefit from skilled PT services to address ROM deficits, address strength deficits, analyze and address soft tissue restrictions and analyze and cue movement patterns to attain remaining goals. [x]  See Plan of Care  []  See progress note/recertification  []  See Discharge Summary         Progress towards goals / Updated goals:  Patient is making progress with strength and ROM. Will continue to work on Knee extension and Eccentric Quad strength.      PLAN  []  Upgrade activities as tolerated     [x]  Continue plan of care  []  Update interventions per flow sheet       []  Discharge due to:_  []  Other:_      Sidra Humphries 11/2/2020  4:29 PM

## 2020-11-05 ENCOUNTER — HOSPITAL ENCOUNTER (OUTPATIENT)
Dept: PHYSICAL THERAPY | Age: 70
Discharge: HOME | End: 2020-11-05
Payer: MEDICARE

## 2020-11-05 PROCEDURE — 97110 THERAPEUTIC EXERCISES: CPT

## 2020-11-05 PROCEDURE — 97016 VASOPNEUMATIC DEVICE THERAPY: CPT

## 2020-11-05 NOTE — PROGRESS NOTES
PT DAILY TREATMENT NOTE     Patient Name: Saad Monday  Date:2020  : 1950  [x]  Patient  Verified  Payor: [employer] MEDICARE / Plan: [address] / Product Type: Managed Care Medicare /    In [id number]  Out time:1025  Total Treatment Time (min): 52  Total Timed Codes (min): 42  Visit #: 3     Treatment Area: Unilateral primary osteoarthritis, right knee [M17.11]    SUBJECTIVE  Pain Level (0-10 scale): 0  Any medication changes, allergies to medications, adverse drug reactions, diagnosis change, or new procedure performed?: [x] No    [] Yes (see summary sheet for update)  Subjective functional status/changes:   [] No changes reported  Pt denies pain when entering clinic. Pt reports the \"biggest backset\" is stair navigation    OBJECTIVE  Modality rationale: decrease pain and increase tissue extensibility to improve the patients ability to participate in therapy.    Min Type Additional Details    [] Estim: []Att   []Unatt  []TENS instruct                 []IFC  []Premod []NMES                       []Other:  []w/US   []w/ice   []w/heat  Position:  Location:    []  Traction: [] Cervical       []Lumbar                       [] Prone          []Supine                       []Intermittent   []Continuous Lbs:  [] before manual  [] after manual    []  Ultrasound: []Continuous   [] Pulsed                           []1MHz   []3MHz Location:  W/cm2:    []  Iontophoresis with dexamethasone         Location: [] Take home patch   [] In clinic    []  Ice     []  heat  []  Ice massage Position:   Location: thoracolumbar   10 [x]  Vasopneumatic Device Pressure: [] lo [x] med [] hi   Temp: [x] lo [] med [] hi   [] Skin assessment post-treatment:  []intact []redness- no adverse reaction       []redness  adverse reaction:       42 min Therapeutic Exercise:  [] See flow sheet :   Rationale: increase ROM, increase strength and improve balance to improve the patient's confidence and ability for stair navigation. Stationary bicycle with increased resistance used as an active warm-up to began session. Exercises completed focusing on end range extension, balance, and unilateral strength. Reps and resistance increased when possible. min Patient Education: [x] Review HEP    [] Progressed/Changed HEP based on:   [] positioning   [] body mechanics   [] transfers   [] heat/ice application        Pain Level (0-10 scale) post treatment: 0    ASSESSMENT/Changes in Function: Pt continues to improve well and has notice a signifcant change with strength and balance since beginning therapy. Pt however continues to lack eccentric control most noticeable when descending steps. Pt also lacks terminal knee extension. Revisited prone static knee extension with added weight to achieve zero degrees. Patient will continue to benefit from skilled PT services to modify and progress therapeutic interventions, address functional mobility deficits, address ROM deficits and address strength deficits to attain remaining goals.      [x]  See Plan of Care  []  See progress note/recertification  []  See Discharge Summary           PLAN  []  Upgrade activities as tolerated     [x]  Continue plan of care  []  Update interventions per flow sheet       []  Discharge due to:_  []  Other:_      BOOM Luque  11/5/2020  11:08 AM

## 2020-11-09 ENCOUNTER — HOSPITAL ENCOUNTER (OUTPATIENT)
Dept: PHYSICAL THERAPY | Age: 70
Discharge: HOME | End: 2020-11-09
Payer: MEDICARE

## 2020-11-09 PROCEDURE — 97110 THERAPEUTIC EXERCISES: CPT

## 2020-11-09 PROCEDURE — 97016 VASOPNEUMATIC DEVICE THERAPY: CPT

## 2020-11-09 NOTE — PROGRESS NOTES
PT DAILY TREATMENT NOTE 8    Patient Name: Peterson Herrera  Date:2020  : 1950  [x]  Patient  Verified  Payor: [employer] / Plan: [address] / Product Type: Managed Care Medicare /    In time:1020  Out time:1109  Total Treatment Time (min): 49  Total Timed Codes (min): 49  1:1 Treatment Time (min): 40  Visit: 13      Treatment Area: Unilateral primary osteoarthritis, right knee [M17.11]    SUBJECTIVE  Pt denies pain stating, \"I still don't feel comfortable walking up steps\" which she attributes to L knee issues. Pain Level (0-10 scale): 0  Any medication changes, allergies to medications, adverse drug reactions, diagnosis change, or new procedure performed?: [x] No    [] Yes (see summary sheet for update)        OBJECTIVE  Modality rationale: decrease inflammation and increase tissue extensibility to improve the patients ability to complete post rehab activities. Min Type Additional Details    [] Estim: []Att   []Unatt  []TENS instruct                 []IFC  []Premod []NMES                       []Other:  []w/US   []w/ice   []w/heat  Position:  Location:    []  Traction: [] Cervical       []Lumbar                       [] Prone          []Supine                       []Intermittent   []Continuous Lbs:  [] before manual  [] after manual    []  Ultrasound: []Continuous   [] Pulsed                           []1MHz   []3MHz Location:  W/cm2:         []  Ice     []  heat  []  Ice massage Position:  Location:   10 [x]  Vasopneumatic Device Pressure: [] lo [x] med [] hi   Temp: [x] lo [] med [] hi   [] Skin assessment post-treatment:  []intact []redness- no adverse reaction       []tredness  adverse reaction:       39 min Therapeutic Exercise:  [x] See flow sheet :   Rationale: increase ROM, increase strength and improve balance to improve the patients ability to return to baseline function with full AROM and strength.     Session began with stationary bicycle followed by self stretches to B LE. Today's session transitioned into strengthening completing circuit without issues. Also focused on gained end range knee extension. min Patient Education: [x] Review HEP    [] Progressed/Changed HEP based on:   [] positioning   [] body mechanics   [] transfers   [] heat/ice application          Pain Level (0-10 scale) post treatment: 0    ASSESSMENT/Changes in Function: Continued with TKA protocol progressing to LE circuit consisting of biodex TKE, leg press, knee flexion, and knee extension. AROM measured 0-125 degrees. Patient will continue to benefit from skilled PT services to modify and progress therapeutic interventions, address functional mobility deficits and address strength deficits to attain remaining goals.      [x]  See Plan of Care  []  See progress note/recertification  []  See Discharge Summary         PLAN  []  Upgrade activities as tolerated     [x]  Continue plan of care  []  Update interventions per flow sheet       []  Discharge due to:_  []  Other:_      BOOM Luque  11/9/2020  11:48 AM

## 2020-11-12 ENCOUNTER — APPOINTMENT (OUTPATIENT)
Dept: PHYSICAL THERAPY | Age: 70
End: 2020-11-12
Payer: MEDICARE

## 2020-11-17 ENCOUNTER — HOSPITAL ENCOUNTER (OUTPATIENT)
Dept: PHYSICAL THERAPY | Age: 70
Discharge: HOME | End: 2020-11-17
Payer: MEDICARE

## 2020-11-17 PROCEDURE — 97110 THERAPEUTIC EXERCISES: CPT

## 2020-11-17 NOTE — PROGRESS NOTES
[facility] PHYSICAL [facility]ERAP[facility]  [address] [place] [address] Dr MCCARTHY, [address] [facility], [address] * Phone: [phone] * Fax: [phone]  DISCHARGE SUMMARY FOR PHYSICAL THERAPY          Patient Name: Gordon Giang : 1950   Treatment/Medical Diagnosis: Unilateral primary osteoarthritis, right knee [M17.11]   Onset Date: 10/5/2020     Referral Source: Shonna Paz MD [facility]): 10/6/2020   Prior Hospitalization: See Medical History Provider #: [id number]   Prior Level of Function: Independent without AD   Comorbidities: Arthritis, Depression, HTN   Medications: Verified on Patient Summary List     Visits from [facility]: 14 Missed Visits: 0     Subjective:  Patient states she is happy with her progress. States she would like to return to her normal hobbies, such as gardening, as soon as possible. States she feels ready for discharge. Reports intermittent edema, denies pain. Objective:    ROM / Strength  []? ? Unable to assess                     AROM                   PROM                    Strength       Left Right Left Right Left Right   Hip Flexion         4+/5 4/5     Extension                 Abduction                 Adduction               Knee Flexion 120 126   127 5/5 5/5     Extension 0 0   +3 5/5 5/5   Ankle Plantarflexion         5/5 5/5     Dorsiflexion         5/5 5/5      Other tests/comments: Timed Up and Go: 8.9 seconds, no AD     Assessment/Goals:   Patient has progressed well under current POC, having returned to her PLOF at this time. Patient is ambulating independently without an AD at her PLOF. Demonstrates a symmetrical gait. Patient demonstrates the ability to ascend steps reciprocally without UE support, descending reciprocally with a single UE. Patient demonstrates full active ROM at this time.      Short Term Goals:  1. Pt will be independent with HEP to improve R knee ROM and strength in 3 weeks. Met.      2.  Pt will improve R knee PROM to 0-120 degrees for improved ADL efficiency in 3 weeks. Met.      3. Pt will perform TUG in <14 seconds for improved household mobility in 3 weeks. Met, pt performed TUG 8 seconds faster today than initial measure.      Long Term Goals:  1. Pt will improve R knee AROM to 0-120 degrees for improved ability to perform household chores requiring bending in 6 weeks. See above, Met.     2. Pt will improve R knee and hip strength to 5/5 for improved stability when negotiating steps in/out of home in 6 weeks. Partially met, knee 5/5, progressing with hip strength. Pt demonstrates reciprocal step pattern on 6 inch steps without UE support, consecutively.       3. Pt will be able to ambulate > 500 feet on level & unlevel surfaces without AD for improved ability return to walking for exercise in 6 weeks. Able at this time, Met.     4. Pt will report no greater than 1-2/10 pain in R knee for improved sleep at night in 6 weeks. Met.       Assessments/Recommendations: Discontinue therapy. Progressing towards or have reached established goals. If you have any questions/comments please contact us directly at [phone]. Thank you for allowing us to assist in the care of your patient. Therapist Signature: Gin Martinez DPT Date: 11/17/2020   Reporting Period: 10/26/20- 11/17/20 Time: 85:70 AM      Certification Period: 10/26/20-12/28/20       NOTE TO PHYSICIAN:  PLEASE COMPLETE THE ORDERS BELOW AND FAX TO   [facility]: [phone]. If you are unable to process this request in 24 hours please contact our office: [phone].    ___ I have read the above report and request that my patient be discharged from therapy.      Physician Signature:        Date:       Time:

## 2020-11-17 NOTE — PROGRESS NOTES
PT DAILY TREATMENT NOTE 8-14    Patient Name: Abelino Lundy  Date:2020  : 1950  [x]  Patient  Verified  Payor: [employer]ICARE / Plan: [address] / Product Type: Managed Care Medicare /    In time:1018  Out time:1048  Total Treatment Time (min): 30  Billed time (min): 20  1:1 Treatment Time (min): 20   Visit #: 14     Diagnosis/ Reason for Treatment: Unilateral primary osteoarthritis, right knee [M17.11]    SUBJECTIVE  Pain Level In(0-10 scale): 0/10    Any medication changes, allergies to medications, adverse drug reactions, diagnosis change, or new procedure performed?: [x] No    [] Yes (see summary sheet for update)    Subjective:  Patient states she is happy with her progress. States she would like to return to her normal hobbies, such as gardening, as soon as possible. States she feels ready for discharge. Reports intermittent edema, denies pain. Medicare/BCBS Only   Total Timed Codes (min):  20 1:1 Treatment Time:  20     OBJECTIV    20 min Therapeutic Exercise:  [] See flow sheet :   Rationale: increase ROM, increase strength, improve coordination and improve balance to improve the patients ability to return to PLOF. With   [x] TE   [] TA   [] neuro   [] other: Patient Education: [x] Review HEP    [] Progressed/Changed HEP based on:   [] positioning   [] body mechanics   [] transfers   [] heat/ice application    [] other:           Pain Level Out(0-10 scale): 0/10    Patient response to today's treatment: Denies any change in symptoms. Functional Assessment: Discharge report due this session, tests and measures completed for this, billed as therapeutic exercise due to the nature of the assessment. Reviewed HEP with patient for discharge, discussing exercises that can be used to help address all residual symptoms/ deficits.  Discussed precautions with patient, instructing her that it is ok to get up and down from the ground, but to do this without impact on knees, and to do this with someone with her the first time until she is sure she can do this safely and effectively.        []  See Plan of Care  []  See progress note/recertification  [x]  See Discharge Summary         PLAN  []  Upgrade activities as tolerated     []  Continue plan of care  []  Update interventions per flow sheet       [x]  Discharge due to:goals have been met  []  Other:_      Danilo Oseguera DPT 11/17/2020  10:52 AM

## 2022-03-18 PROBLEM — E66.01 SEVERE OBESITY (HCC): Status: ACTIVE | Noted: 2020-09-28

## 2022-03-18 PROBLEM — M17.9 KNEE OSTEOARTHRITIS: Status: ACTIVE | Noted: 2020-10-05

## 2022-03-19 PROBLEM — I10 ESSENTIAL HYPERTENSION: Status: ACTIVE | Noted: 2020-09-25

## 2022-04-18 ENCOUNTER — HOSPITAL ENCOUNTER (EMERGENCY)
Age: 72
Discharge: HOME | End: 2022-04-18
Attending: FAMILY MEDICINE
Payer: MEDICARE

## 2022-04-18 VITALS
HEIGHT: 61 IN | TEMPERATURE: 98.3 F | DIASTOLIC BLOOD PRESSURE: 87 MMHG | RESPIRATION RATE: 17 BRPM | BODY MASS INDEX: 41.54 KG/M2 | SYSTOLIC BLOOD PRESSURE: 135 MMHG | OXYGEN SATURATION: 97 % | WEIGHT: 220 LBS | HEART RATE: 97 BPM

## 2022-04-18 DIAGNOSIS — M54.32 SCIATICA OF LEFT SIDE: Primary | ICD-10-CM

## 2022-04-18 PROCEDURE — 99284 EMERGENCY DEPT VISIT MOD MDM: CPT

## 2022-04-18 PROCEDURE — 96372 THER/PROPH/DIAG INJ SC/IM: CPT

## 2022-04-18 PROCEDURE — 74011250636 HC RX REV CODE- 250/636: Performed by: FAMILY MEDICINE

## 2022-04-18 PROCEDURE — 74011250637 HC RX REV CODE- 250/637: Performed by: FAMILY MEDICINE

## 2022-04-18 RX ORDER — TRAMADOL HYDROCHLORIDE 50 MG/1
50 TABLET ORAL
Status: COMPLETED | OUTPATIENT
Start: 2022-04-18 | End: 2022-04-18

## 2022-04-18 RX ORDER — BACLOFEN 10 MG/1
10 TABLET ORAL
Status: COMPLETED | OUTPATIENT
Start: 2022-04-18 | End: 2022-04-18

## 2022-04-18 RX ORDER — BACLOFEN 10 MG/1
10 TABLET ORAL
Qty: 15 TABLET | Refills: 0 | Status: SHIPPED | OUTPATIENT
Start: 2022-04-18

## 2022-04-18 RX ORDER — TRAMADOL HYDROCHLORIDE 50 MG/1
50 TABLET ORAL
Qty: 12 TABLET | Refills: 0 | Status: SHIPPED | OUTPATIENT
Start: 2022-04-18 | End: 2022-04-21

## 2022-04-18 RX ORDER — HYDROCHLOROTHIAZIDE 25 MG/1
1 TABLET ORAL DAILY
COMMUNITY
Start: 2022-01-25

## 2022-04-18 RX ORDER — METFORMIN HYDROCHLORIDE 500 MG/1
1 TABLET, EXTENDED RELEASE ORAL DAILY
COMMUNITY
Start: 2022-01-24

## 2022-04-18 RX ORDER — PREDNISONE 20 MG/1
20 TABLET ORAL DAILY
Qty: 7 TABLET | Refills: 0 | Status: SHIPPED | OUTPATIENT
Start: 2022-04-18 | End: 2022-04-25

## 2022-04-18 RX ORDER — KETOROLAC TROMETHAMINE 30 MG/ML
30 INJECTION, SOLUTION INTRAMUSCULAR; INTRAVENOUS
Status: COMPLETED | OUTPATIENT
Start: 2022-04-18 | End: 2022-04-18

## 2022-04-18 RX ADMIN — TRAMADOL HYDROCHLORIDE 50 MG: 50 TABLET, COATED ORAL at 11:35

## 2022-04-18 RX ADMIN — BACLOFEN 10 MG: 10 TABLET ORAL at 10:31

## 2022-04-18 RX ADMIN — KETOROLAC TROMETHAMINE 30 MG: 30 INJECTION, SOLUTION INTRAMUSCULAR at 10:32

## 2022-04-18 NOTE — ED PROVIDER NOTES
Patient presents to the ED for left hip pain. Symptoms began yesterday evening and have progressed. No inciting event. She reports similar pain in the past but never this bad. She tried Tylenol for her pain without relief, last dose 0600 this morning. Pain is located in the posterior left hip/buttock area and will intermittently radiate down the back of her left leg. No numbness, tingling, giving out, or changes in bowel/bladder habits. No history of gout. She reports a history of arthritis. No swelling or overlying skin changes. She reports difficulty sleeping and ambulating due to pain.  Tried slepinf with an ice pack and an icy hot patch without improvement           Past Medical History:   Diagnosis Date    Dyslipidemia with elevated low density lipoprotein (LDL) cholesterol and abnormally low high density lipoprotein cholesterol     Essential hypertension 9/25/2020    GERD (gastroesophageal reflux disease)     Hypertension        Past Surgical History:   Procedure Laterality Date    FOOT/TOES SURGERY PROC UNLISTED      HX KNEE REPLACEMENT Right     HX ORTHOPAEDIC      foot surgery         Family History:   Problem Relation Age of Onset    Heart Disease Mother     Heart Disease Father     Cancer Father        Social History     Socioeconomic History    Marital status:      Spouse name: Not on file    Number of children: Not on file    Years of education: Not on file    Highest education level: Not on file   Occupational History    Not on file   Tobacco Use    Smoking status: Never Smoker    Smokeless tobacco: Never Used   Vaping Use    Vaping Use: Never used   Substance and Sexual Activity    Alcohol use: Not Currently     Comment: rare    Drug use: Never    Sexual activity: Not on file   Other Topics Concern    Not on file   Social History Narrative    Not on file     Social Determinants of Health     Financial Resource Strain:     Difficulty of Paying Living Expenses: Not on file Food Insecurity:     Worried About Running Out of Food in the Last Year: Not on file    [place] of Food in the Last Year: Not on file   Transportation Needs:     Lack of Transportation (Medical): Not on file    Lack of Transportation (Non-Medical): Not on file   Physical Activity:     Days of Exercise per Week: Not on file    Minutes of Exercise per Session: Not on file   Stress:     Feeling of Stress : Not on file   Social Connections:     Frequency of Communication with Friends and Family: Not on file    Frequency of Social Gatherings with Friends and Family: Not on file    Attends Christianity Services: Not on file    Active Member of [address] [place] [address] [employer] or Organizations: Not on file    Attends Club or Organization Meetings: Not on file    Marital Status: Not on file   Intimate Partner Violence:     Fear of Current or Ex-Partner: Not on file    Emotionally Abused: Not on file    Physically Abused: Not on file    Sexually Abused: Not on file   Housing Stability:     Unable to Pay for Housing in the Last Year: Not on file    Number of [place] in the Last Year: Not on file    Unstable Housing in the Last Year: Not on file         ALLERGIES: Patient has no known allergies. Review of Systems   Constitutional: Negative for chills and fever. Gastrointestinal: Negative for constipation. Genitourinary: Negative for difficulty urinating and dysuria. Musculoskeletal: Positive for gait problem and joint swelling. Skin: Negative for color change. Neurological: Negative for syncope and numbness. All other systems reviewed and are negative. Vitals:    04/18/22 0946   BP: (!) 127/92   Pulse: (!) 105   Resp: 19   Temp: 98.3 °F (36.8 °C)   SpO2: 97%   Weight: 99.8 kg (220 lb)   Height: 5' 1\" (1.549 m)            Physical Exam  Vitals and nursing note reviewed. Constitutional:       General: She is not in acute distress. Appearance: Normal appearance. She is obese.  She is not ill-appearing or diaphoretic. HENT:      Head: Normocephalic and atraumatic. Right Ear: External ear normal.      Left Ear: External ear normal.   Eyes:      General:         Right eye: No discharge. Left eye: No discharge. Cardiovascular:      Rate and Rhythm: Normal rate. Pulses: Normal pulses. Pulmonary:      Effort: Pulmonary effort is normal. No respiratory distress. Breath sounds: Normal breath sounds. No stridor. Musculoskeletal:         General: Tenderness present. No swelling, deformity or signs of injury. Right hip: Normal.      Left hip: Tenderness present. No deformity, bony tenderness or crepitus. Normal strength. Right upper leg: Normal.      Left upper leg: Normal.      Right lower leg: No edema. Left lower leg: No edema. Comments: Pain with external rotation, no pain with internal rotation. TTP of gluteus on left. No paraspinal muscle TTP, muscle spasm noted   Skin:     General: Skin is warm and dry. Capillary Refill: Capillary refill takes less than 2 seconds. Findings: No bruising, erythema or lesion. Neurological:      General: No focal deficit present. Mental Status: She is alert and oriented to person, place, and time. Sensory: No sensory deficit.       Deep Tendon Reflexes: Reflexes normal.          MDM  Number of Diagnoses or Management Options  Diagnosis management comments: Muscle strain, muscle spasm, arthritis, sciatica    Risk of Complications, Morbidity, and/or Mortality  Presenting problems: moderate  Diagnostic procedures: minimal  Management options: low  General comments: Patient feeling better, stable for discharge with PCP follow up    Patient Progress  Patient progress: improved         Procedures

## 2022-12-29 ENCOUNTER — HOSPITAL ENCOUNTER (OUTPATIENT)
Dept: PHYSICAL THERAPY | Age: 72
End: 2022-12-29
Payer: MEDICARE

## 2022-12-29 PROCEDURE — 97162 PT EVAL MOD COMPLEX 30 MIN: CPT

## 2022-12-29 NOTE — THERAPY EVALUATION
[address] [facility] Adrian Brown, [address] [facility], [address] [place] [address]  PLAN OF CARE / STATEMENT OF MEDICAL NECESSITY FOR PHYSICAL THERAPY SERVICES  Patient Name: Golden Kingston : 1950   Medical   Diagnosis: Spondylolisthesis, site unspecified [M43.10]  Other intervertebral disc degeneration, lumbar region [M51.36] Treatment Diagnosis: R26.2; M62.81   Onset Date: 2022     Referral Source: Karissa Goodman MD Start of Care [facility]): 2022   Prior Hospitalization: See medical history Provider #: [id number]   Prior Level of Function: Independent; used no AD for ambulation   Comorbidities: GERD, HTN, dyslipidemia   Medications: Verified on Patient Summary List   The Plan of Care and following information is based on the information from the initial evaluation.   ==========================================================================================  Assessment / Functional Analysis:    Pt is a 66 y/o female who presents to physical therapy with BLE weakness and decreased balance since mid-2022 after a hospitalization for glomerulonephritis. Pt received in waiting area ambulating with [facility] exhibiting an abnormal gait pattern of decreased step length, decreased gait speed, and decreased tammy. Pt reports that after her hospitalization her mobility was limited and she began losing strength. Prior to hospitalization pt was independent with mobility and used no AD for ambulation. Pt presents today with decreased activity tolerance, decreased strength, decreased balance, and decreased ability to ambulate with normalized gait pattern.  Pt could benefit from skilled PT services to address the above impairments and to improve Pt ability to participate in functional activities of choice.     ==========================================================================================  Eval Complexity: History: MEDIUM  Complexity : 1-2 comorbidities / personal factors will impact the outcome/ POC Exam:LOW Complexity : 1-2 Standardized tests and measures addressing body structure, function, activity limitation and / or participation in recreation  Presentation: LOW Complexity : Stable, uncomplicated  Clinical Decision Making:MEDIUM Complexity : FOTO score of 26-74Overall Complexity:MEDIUM    Problem List: decrease strength, impaired gait/ balance, decrease ADL/ functional abilitiies, decrease activity tolerance, decrease flexibility/ joint mobility, and decrease transfer abilities   Treatment Plan may include any combination of the following: Therapeutic exercise, Neuromuscular reeducation, Manual therapy, Therapeutic activity, Self care/home management, Electric stim unattended , Aquatic therapy, and Gait training  Patient / Family readiness to learn indicated by: asking questions, trying to perform skills, and interest  Persons(s) to be included in education: patient (P)  Barriers to Learning/Limitations: None      Patient self reported health status: good  Rehabilitation Potential: good    Objective Measures:  LEFS Score: 37.9  FOTO: 47  30STS: 7 reps  SLS:  LLE = 3 sec; RLE = 11 sec    ROM/Strength                   AROM                          PROM                       Strength (1-5)  Hip Left Right Left Right Left Right   [facility]     4-/5 4-/5   [facility]     4+/5 4+/5   Abduction [facility] WFL     4/5 4/5   Adduction [facility] W[facility]     4/5 4/5   ER [facility] W[facility]     4/5 4/5   IR WFL WFL     4+/5 4+/5   Knee Left Right Left Right Left Right   Extension [facility] W[facility]     5/5 5/5   [facility]     4/5 4/5        Short Term Goals: 3 weeks  Patient will report the knowledge of 3 exercises that can be used to help reduce symptoms to be able to ind reduce symptoms while at home. Patient will demonstrate a 1/2 grade improvement in BLE MMT to be able to better ambulate with a normalized gait without pain.   Patient will increase LEFS > 10 points to facilitate increased ability to perform functional activities of choice. Long Term Goals: 6 weeks  Patient will demonstrate a 2 grade improvement in BLE MMT to be able to better ambulate with a normalized gait without pain. Pt will safely complete 30 second STS test with a score of 10 in order to improve activity tolerance to improve functional mobility, decrease fall risk, and decrease caregiver dependence. Patient will demonstrate the ability to ambulate > 500 feet without an AD with normalized gait pattern to be able to return to an ind walking program following discharge. Pt will be able to safely complete Single Leg Stance balance test for 30 seconds on each LE for improved balance during gait in order to improve static/dynamic balance to improve functional mobility, decrease fall risk, and decrease caregiver dependence. Patient will increase LEFS > 20 points to facilitate increased ability to perform leisure activities of choice. Frequency / Duration: Patient to be seen  2  times per week for 6  weeks:  Patient / Caregiver education and instruction: self care, activity modification, and exercises    Therapist Signature: Wesley Alonzo PT, DPT Date: 37/80/4867   Certification Period: 12/29/22 - 3/29/23 Time: 10:41 AM   ===========================================================================================  I certify that the above Physical Therapy Services are being furnished while the patient is under my care. I agree with the treatment plan and certify that this therapy is necessary. Physician Signature:        Date:       Time:     Please sign and return to [facility] [place] [facility] or you may fax the signed copy to [phone]. Please call [phone] if more information required. Thank you.

## 2022-12-29 NOTE — THERAPY EVALUATION
PT DAILY TREATMENT NOTE/HIP [id number]    Patient Name: Kp Veloz  Date:2022  : 1950  [x]  Patient  Verified  Payor: Angely Leung / Plan: [facility] MEDICARE CHOICE PPO/PFFS / Product Type: Managed Care Medicare /    In time: 0900  Out time: 0935  Total Treatment Time (min): 35  Visit #: 1    Medicare/BCBS Only   Total Timed Codes (min):  0 1:1 Treatment Time:  35     Treatment Area: Spondylolisthesis, site unspecified [M43.10]  Other intervertebral disc degeneration, lumbar region [M51.36]    SUBJECTIVE  Pain Level (0-10 scale): Current:  0/10  []Sharp  []Dull  []Achy []Burning []Throbbing []N&T []Other:  []constant []intermittent []improving []worsening []no change since onset    Any medication changes, allergies to medications, adverse drug reactions, diagnosis change, or new procedure performed?: [x] No    [] Yes (see summary sheet for update)    Subjective:   Pt is a 68 y/o female who presents to physical therapy with BLE weakness and decreased balance since mid-2022 after a hospitalization for glomerulonephritis. Pt received in waiting area ambulating with [facility] exhibiting an abnormal gait pattern of decreased step length, decreased gait speed, and decreased tammy. Pt reports that after her hospitalization her mobility was limited and she began losing strength. Pt presents today with decreased activity tolerance, decreased strength, decreased balance, and decreased ability to ambulate with normalized gait pattern. Pt could benefit from skilled PT services to address the above impairments and to improve Pt ability to participate in functional activities of choice.        Patient Goals: Improve strength and balance  Previous Treatment/Compliance: na  Barriers: []pain []financial []time []transportation []other  Motivation: good  Substance use: []Alcohol []Tobacco []other:   LEFS Score: 37.9  FOTO: 47  30STS: 7 reps  SLS:  LLE = 3 sec; RLE = 11 sec    OBJECTIVE/EXAMINATION  Posture:    Gait:  [] Normal    [x] Abnormal    [] Antalgic    [] NWB    Device:    Describe: decreased step length, decreased gait speed, and decreased tammy         ROM/Strength        AROM                     PROM        Strength (1-5)  Hip Left Right Left Right Left Right   [facility]   4-/5 4-/5   [facility]   4+/5 4+/5   Abduction [facility] WFL   4/5 4/5   Adduction [facility] WFL   4/5 4/5   ER [facility]   4/5 4/5   [facility] W[facility]   4+/5 4+/5   Knee Left Right Left Right Left Right   [facility]   5/5 5/5   [facility]   4/5 4/5        Flexibility: [] Unable to assess at this time  Hip Flexor:  (L) Tightness= [] WNL   [] Min   [] Mod   [] Severe    (R) Tightness= [] WNL   [] Min   [] Mod   [] Severe  Hamstrings:    (L) Tightness= [] WNL   [x] Min   [] Mod   [] Severe    (R) Tightness= [] WNL   [x] Min   [] Mod   [] Severe  Quadriceps:    (L) Tightness= [] WNL   [] Min   [] Mod   [] Severe    (R) Tightness= [] WNL   [] Min   [] Mod   [] Severe  Gastroc:    (L) Tightness= [] WNL   [] Min   [] Mod   [] Severe    (R) Tightness= [] WNL   [] Min   [] Mod   [] Severe                                  Palpation  [] Min  [] Mod  [] Severe    Location:  [] Min  [] Mod  [] Severe    Location:  [] Min  [] Mod  [] Severe    Location:    Optional Tests  Florentino/ Karo Test: [] Neg    [] Pos  Jose Enrique Test:  [] Neg    [] Pos  Scouring Test: [] Neg    [] Pos  Trendelenberg:  [] Neg    [] Pos [] Left    [] Right  OberTest:   [] Neg    [] Pos  Ely's Test:  [] Neg    [] Pos  Piriformis Test:  [] Neg    [] Pos  Sub-talor alignment: [] Neurtral [] Pronation [] Supination  Forefoot alignment: [] Neutral [] Varus [] Valgus  Functional Leg Length (cm):   Right:  Left:  Discrepancy:    Other tests/ comments:    Mobility: Independent  Self Care:  Independent      Modality rationale:    Min Type Additional Details    [] Estim:  []Unatt       []IFC  []Premod                        []Other:  []w/ice []w/heat  Position:  Location:    [] Estim: []Att    []TENS instruct  []NMES                    []Other:  []w/US   []w/ice   []w/heat  Position:  Location:    []  Traction: [] Cervical       []Lumbar                       [] Prone          []Supine                       []Intermittent   []Continuous Lbs:  [] before manual  [] after manual    []  Ultrasound: []Continuous   [] Pulsed                           []1MHz   []3MHz Location:  W/cm2:    []  Iontophoresis with dexamethasone         Location: [] Take home patch   [] In clinic    []  Ice     []  heat  []  Ice massage  []  Laser   []  Anodyne Position:  Location:    []  Laser with stim  []  Other: Position:  Location:    []  Vasopneumatic Device Pressure:       [] lo [] med [] hi   Temperature: [] lo [] med [] hi   [] Skin assessment post-treatment:  []intact []redness- no adverse reaction    []redness - adverse reaction:     35 min [x]Eval                  []Re-Eval               With   [] TE   [] TA   [] neuro   [] other: Patient Education: [x] Review HEP    [] Progressed/Changed HEP based on:   [] positioning   [] body mechanics   [] transfers   [] heat/ice application    [] other:      Pain Level (0-10 scale) post treatment: 0/10      ASSESSMENT:        [x]  See Plan of Care  []  See progress note/recertification  []  See Discharge Summary           Peace Marcial PT, DPT  12/29/2022  9:07 AM

## 2023-01-03 ENCOUNTER — HOSPITAL ENCOUNTER (OUTPATIENT)
Dept: PHYSICAL THERAPY | Age: 73
End: 2023-01-03
Payer: MEDICARE

## 2023-01-03 PROCEDURE — 97110 THERAPEUTIC EXERCISES: CPT

## 2023-01-03 PROCEDURE — 97112 NEUROMUSCULAR REEDUCATION: CPT

## 2023-01-03 NOTE — PROGRESS NOTES
PT DAILY TREATMENT NOTE     Patient Name: Giorgio Patterson  Date:1/3/2023  : 1950  [x]  Patient  Verified  Payor: Hoa Esquivelkvng / Plan: [facility] MEDICARE CHOICE PPO/PFFS / Product Type: Managed Care Medicare /    In time: 97  Out time: 8865  Total Treatment Time (min): 54  Total Timed Codes (min): 54  1:1 Treatment Time (min): 54   Visit # 2      Treatment Area: Spondylolisthesis, site unspecified [M43.10]  Other intervertebral disc degeneration, lumbar region [M51.36]    SUBJECTIVE  Pt reports no pain today. She is compliant with HEP and notes continued difficulty with SLS.   Pain Level (0-10 scale): 0/10  Any medication changes, allergies to medications, adverse drug reactions, diagnosis change, or new procedure performed?: [x] No    [] Yes (see summary sheet for update)        OBJECTIVE  Modality rationale:    Min Type Additional Details    [] Estim: []Att   []Unatt  []TENS instruct                 []IFC  []Premod []NMES                       []Other:  []w/US   []w/ice   []w/heat  Position:  Location:    []  Traction: [] Cervical       []Lumbar                       [] Prone          []Supine                       []Intermittent   []Continuous Lbs:  [] before manual  [] after manual    []  Ultrasound: []Continuous   [] Pulsed                           []1MHz   []3MHz Location:  W/cm2:         []  Ice     []  heat  []  Ice massage Position:  Location:    []  Vasopneumatic Device Pressure: [] lo [] med [] hi   Temp: [] lo [] med [] hi   [] Skin assessment post-treatment:  []intact []redness- no adverse reaction       []redness - adverse reaction:       39 min Therapeutic Exercise:  [x] See flow sheet :   Rationale: increase ROM and increase strength to improve the patients ability to ambulate    15 min Neuromuscular Re-education:  [x]  See flow sheet :   Rationale: increase coordination and balance to improve pt ability to transfer and decrease fall risk              With TE Patient Education: [x] Review HEP    [] Progressed/Changed HEP based on:   [] positioning   [] body mechanics   [] transfers   [] heat/ice application          Pain Level (0-10 scale) post treatment: 0/10    ASSESSMENT/Changes in Function:   Session initiated on stepper followed by self stretching and PRE. Today's Tx session emphasized exercises per POC to increase BLE strength and improve balanc with Pt tolerating Tx with no adverse effects. Pt demonstrates good recall when performing exercises during today's Tx session. Introduced step ups, balance board, leg press, HSC, and quad extension and pt tolerates well. PT will continue per POC, progressing as tolerated. Patient will continue to benefit from skilled PT services to modify and progress therapeutic interventions, address functional mobility deficits, address strength deficits, analyze and address soft tissue restrictions, analyze and cue movement patterns, analyze and modify body mechanics/ergonomics, assess and modify postural abnormalities, and address imbalance/dizziness to attain remaining goals.      []  See Plan of Care  []  See progress note/recertification  []  See Discharge Summary             PLAN  [x]  Upgrade activities as tolerated     [x]  Continue plan of care  [x]  Update interventions per flow sheet       []  Discharge due to:_  []  Other:_      Wesley Alonzo, PT, DPT 1/3/2023  2:51 PM

## 2023-01-05 ENCOUNTER — HOSPITAL ENCOUNTER (OUTPATIENT)
Dept: PHYSICAL THERAPY | Age: 73
End: 2023-01-05
Payer: MEDICARE

## 2023-01-05 PROCEDURE — 97110 THERAPEUTIC EXERCISES: CPT

## 2023-01-05 NOTE — PROGRESS NOTES
PT DAILY TREATMENT NOTE 8-    Patient Name: Jewels Appiah  Date:2023  : 1950  [x]  Patient  Verified  Payor: Ginette Schmitz / Plan: [facility] HUMAN MEDICARE CHOICE PPO/PFFS / Product Type: Managed Care Medicare /    In time:1408  Out time:1506  Total Treatment Time (min): 58  Total Timed Codes (min): 58  1:1 Treatment Time (min): 62   Visit #: 3 of 12    Treatment Area: Spondylolisthesis, site unspecified [M43.10]  Other intervertebral disc degeneration, lumbar region [M51.36]    SUBJECTIVE  Pt reported that she was a little sore after last session.       Pain Level (0-10 scale): 0/10    Any medication changes, allergies to medications, adverse drug reactions, diagnosis change, or new procedure performed?: [x] No    [] Yes (see summary sheet for update)        OBJECTIVE  Modality rationale: NA today   Min Type Additional Details    [] Estim: []Att   []Unatt  []TENS instruct                 []IFC  []Premod []NMES                       []Other:  []w/US   []w/ice   []w/heat  Position:  Location:    []  Traction: [] Cervical       []Lumbar                       [] Prone          []Supine                       []Intermittent   []Continuous Lbs:  [] before manual  [] after manual    []  Ultrasound: []Continuous   [] Pulsed                           []1MHz   []3MHz Location:  W/cm2:    []  Iontophoresis with dexamethasone         Location: [] Take home patch   [] In clinic    []  Ice     []  heat  []  Ice massage Position:  Location:    []  Vasopneumatic Device Pressure: [] lo [] med [] hi   Temp: [] lo [] med [] hi   [] Skin assessment post-treatment:  []intact []redness- no adverse reaction       []redness - adverse reaction:           58 min Therapeutic Exercise:  [x] See flow sheet :   Rationale: increase ROM, increase strength, improve coordination, and improve balance to improve the patients ability to return to prior level of function before injury/illness with reduced pain, achieving optimal strength and function to perform household tasks, daily activities, and return to community events, and/or work. min Therapeutic Activity:  []  See flow sheet :   Rationale:       min Neuromuscular Re-education:  []  See flow sheet :        min Manual Therapy:          min Gait Training:  ___ feet with ___ device on level surfaces with ___ level of assist   Rationale: With TE  TA   NR  GT   Misc Patient Education: [x] Review HEP    [] Progressed/Changed HEP based on:   [] positioning   [] body mechanics   [] transfers   [] heat/ice application          Pain Level (0-10 scale) post treatment: 0/10    ASSESSMENT/Changes in Function: Began session with warm up on stepper,, followed by standing strengthening exercises, balance challenges, and nautilus strengthening for B LEs. Pt progressing well. Cont POC. Patient will continue to benefit from skilled PT services to modify and progress therapeutic interventions, address functional mobility deficits, address ROM deficits, address strength deficits, analyze and cue movement patterns, and analyze and modify body mechanics/ergonomics to attain remaining goals.      [x]  See Plan of Care  []  See progress note/recertification  []  See Discharge Summary           PLAN  [x]  Upgrade activities as tolerated     [x]  Continue plan of care  []  Update interventions per flow sheet       []  Discharge due to:_  []  Other:_      Sarah Parisi PTA , BOOM 1/5/2023  4:21 PM

## 2023-01-09 ENCOUNTER — APPOINTMENT (OUTPATIENT)
Dept: PHYSICAL THERAPY | Age: 73
End: 2023-01-09
Payer: MEDICARE

## 2023-01-09 PROCEDURE — 97110 THERAPEUTIC EXERCISES: CPT

## 2023-01-09 NOTE — PROGRESS NOTES
PT DAILY TREATMENT NOTE 8    Patient Name: Mei Carrion  Date:2023  : 1950  [x]  Patient  Verified  Payor: Stephaniekevin Leeanne / Plan: [facility] HUMAN MEDICARE CHOICE PPO/PFFS / Product Type: Managed Care Medicare /    In time:1307  Out time:1400  Total Treatment Time (min): 53  Total Timed Codes (min): 53  1:1 Treatment Time (min): 48   Visit #: 4 of 12    Treatment Area: Spondylolisthesis, site unspecified [M43.10]  Other intervertebral disc degeneration, lumbar region [M51.36]    SUBJECTIVE  Pt reported that she was a little sore after last session, and hip is hurting a little. States that she can already see a difference in only a few sessions.         Pain Level (0-10 scale): 0/10    Any medication changes, allergies to medications, adverse drug reactions, diagnosis change, or new procedure performed?: [x] No    [] Yes (see summary sheet for update)        OBJECTIVE  Modality rationale: NA today   Min Type Additional Details    [] Estim: []Att   []Unatt  []TENS instruct                 []IFC  []Premod []NMES                       []Other:  []w/US   []w/ice   []w/heat  Position:  Location:    []  Traction: [] Cervical       []Lumbar                       [] Prone          []Supine                       []Intermittent   []Continuous Lbs:  [] before manual  [] after manual    []  Ultrasound: []Continuous   [] Pulsed                           []1MHz   []3MHz Location:  W/cm2:    []  Iontophoresis with dexamethasone         Location: [] Take home patch   [] In clinic    []  Ice     []  heat  []  Ice massage Position:  Location:    []  Vasopneumatic Device Pressure: [] lo [] med [] hi   Temp: [] lo [] med [] hi   [] Skin assessment post-treatment:  []intact []redness- no adverse reaction       []redness - adverse reaction:           53 min Therapeutic Exercise:  [x] See flow sheet :   Rationale: increase ROM, increase strength, improve coordination, and improve balance to improve the patients ability to return to prior level of function before injury/illness with reduced pain, achieving optimal strength and function to perform household tasks, daily activities, and return to community events, and/or work. min Therapeutic Activity:  []  See flow sheet :   Rationale:       min Neuromuscular Re-education:  []  See flow sheet :        min Manual Therapy:          min Gait Training:  ___ feet with ___ device on level surfaces with ___ level of assist   Rationale: With TE  TA   NR  GT   Misc Patient Education: [x] Review HEP    [] Progressed/Changed HEP based on:   [] positioning   [] body mechanics   [] transfers   [] heat/ice application          Pain Level (0-10 scale) post treatment: 0/10    ASSESSMENT/Changes in Function: Began session with warm up on stepper, followed by standing strengthening exercises, balance challenges, and nautilus strengthening for B LEs. Pt progressing well. Cont POC. Patient will continue to benefit from skilled PT services to modify and progress therapeutic interventions, address functional mobility deficits, address ROM deficits, address strength deficits, analyze and cue movement patterns, and analyze and modify body mechanics/ergonomics to attain remaining goals.      [x]  See Plan of Care  []  See progress note/recertification  []  See Discharge Summary           PLAN  [x]  Upgrade activities as tolerated     [x]  Continue plan of care  []  Update interventions per flow sheet       []  Discharge due to:_  []  Other:_      Hermilo Her PTA, LPTA 1/9/2023  2:11 PM

## 2023-01-13 ENCOUNTER — APPOINTMENT (OUTPATIENT)
Dept: PHYSICAL THERAPY | Age: 73
End: 2023-01-13
Payer: MEDICARE

## 2023-01-13 PROCEDURE — 97110 THERAPEUTIC EXERCISES: CPT

## 2023-01-13 NOTE — PROGRESS NOTES
PT DAILY TREATMENT NOTE     Patient Name: Tamar Logan  Date:2023  : 1950  [x]  Patient  Verified  Payor: Douglas Myers / Plan: [facility] MEDICARE CHOICE PPO/PFFS / Product Type: Managed Care Medicare /    In time:10:55  Out time: 11:45  Total Treatment Time (min): 50  Total Timed Codes (min): 50  1:1 Treatment Time (min): 50   Visit #: 4 of 12    Treatment Area: Spondylolisthesis, site unspecified [M43.10]  Other intervertebral disc degeneration, lumbar region [M51.36]    SUBJECTIVE  Pt reported that she can already see a difference in only a few sessions. She denies falls and denies pain.       Pain Level (0-10 scale): 0/10    Any medication changes, allergies to medications, adverse drug reactions, diagnosis change, or new procedure performed?: [x] No    [] Yes (see summary sheet for update)        OBJECTIVE  Modality rationale: NA today   Min Type Additional Details    [] Estim: []Att   []Unatt  []TENS instruct                 []IFC  []Premod []NMES                       []Other:  []w/US   []w/ice   []w/heat  Position:  Location:    []  Traction: [] Cervical       []Lumbar                       [] Prone          []Supine                       []Intermittent   []Continuous Lbs:  [] before manual  [] after manual    []  Ultrasound: []Continuous   [] Pulsed                           []1MHz   []3MHz Location:  W/cm2:    []  Iontophoresis with dexamethasone         Location: [] Take home patch   [] In clinic    []  Ice     []  heat  []  Ice massage Position:  Location:    []  Vasopneumatic Device Pressure: [] lo [] med [] hi   Temp: [] lo [] med [] hi   [] Skin assessment post-treatment:  []intact []redness- no adverse reaction       []redness - adverse reaction:           50 min Therapeutic Exercise:  [x] See flow sheet :   Rationale: increase ROM, increase strength, improve coordination, and improve balance to improve the patients ability to return to prior level of function before injury/illness with reduced pain, achieving optimal strength and function to perform household tasks, daily activities, and return to community events, and/or work. min Therapeutic Activity:  []  See flow sheet :   Rationale:       min Neuromuscular Re-education:  []  See flow sheet :        min Manual Therapy:          min Gait Training:  ___ feet with ___ device on level surfaces with ___ level of assist   Rationale: With TE  TA   NR  GT   Misc Patient Education: [x] Review HEP    [] Progressed/Changed HEP based on:   [] positioning   [] body mechanics   [] transfers   [] heat/ice application          Pain Level (0-10 scale) post treatment: 0/10    ASSESSMENT/Changes in Function: Began session with warm up on bike with pillow behind her back, followed by HS stretch B LE's. Continued with standing strengthening exercises and balance challenges. Introduced SLS this date B due to noted ankle insuffiencey. Continued nautilus strengthening via leg press (44#), Knee extension (22#) and knee flexion (22#) for B LEs. Pt progressing well. Cont POC. Patient will continue to benefit from skilled PT services to modify and progress therapeutic interventions, address functional mobility deficits, address ROM deficits, address strength deficits, analyze and cue movement patterns, and analyze and modify body mechanics/ergonomics to attain remaining goals.      [x]  See Plan of Care  []  See progress note/recertification  []  See Discharge Summary           PLAN  [x]  Upgrade activities as tolerated     [x]  Continue plan of care  []  Update interventions per flow sheet       []  Discharge due to:_  []  Other:_      BOOM Nunez 1/13/2023  11:54 AM

## 2023-01-16 ENCOUNTER — HOSPITAL ENCOUNTER (OUTPATIENT)
Dept: PHYSICAL THERAPY | Age: 73
Discharge: HOME | End: 2023-01-16
Payer: MEDICARE

## 2023-01-16 PROCEDURE — 97110 THERAPEUTIC EXERCISES: CPT

## 2023-01-16 NOTE — PROGRESS NOTES
PT DAILY TREATMENT NOTE 8-14    Patient Name: Vanita Forrest  Date:2023  : 1950  [x]  Patient  Verified  Payor: Aleksandar Rowe / Plan: [facility] MEDICARE CHOICE PPO/PFFS / Product Type: Managed Care Medicare /    In time:10:45  Out time: 11:37  Total Treatment Time (min): 52  Total Timed Codes (min): 52  1:1 Treatment Time (min): 52  Visit #: 4 of 12    Treatment Area: Spondylolisthesis, site unspecified [M43.10]  Other intervertebral disc degeneration, lumbar region [M51.36]    SUBJECTIVE  Pt reported that she is doing well today. She denies falls and denies pain.       Pain Level (0-10 scale): 0/10    Any medication changes, allergies to medications, adverse drug reactions, diagnosis change, or new procedure performed?: [x] No    [] Yes (see summary sheet for update)        OBJECTIVE  Modality rationale: NA today   Min Type Additional Details    [] Estim: []Att   []Unatt  []TENS instruct                 []IFC  []Premod []NMES                       []Other:  []w/US   []w/ice   []w/heat  Position:  Location:    []  Traction: [] Cervical       []Lumbar                       [] Prone          []Supine                       []Intermittent   []Continuous Lbs:  [] before manual  [] after manual    []  Ultrasound: []Continuous   [] Pulsed                           []1MHz   []3MHz Location:  W/cm2:    []  Iontophoresis with dexamethasone         Location: [] Take home patch   [] In clinic    []  Ice     []  heat  []  Ice massage Position:  Location:    []  Vasopneumatic Device Pressure: [] lo [] med [] hi   Temp: [] lo [] med [] hi   [] Skin assessment post-treatment:  []intact []redness- no adverse reaction       []redness - adverse reaction:           52 min Therapeutic Exercise:  [x] See flow sheet :   Rationale: increase ROM, increase strength, improve coordination, and improve balance to improve the patients ability to return to prior level of function before injury/illness with reduced pain, achieving optimal strength and function to perform household tasks, daily activities, and return to community events, and/or work. min Therapeutic Activity:  []  See flow sheet :   Rationale:       min Neuromuscular Re-education:  []  See flow sheet :        min Manual Therapy:          min Gait Training:  ___ feet with ___ device on level surfaces with ___ level of assist   Rationale: With TE  TA   NR  GT   Misc Patient Education: [x] Review HEP    [] Progressed/Changed HEP based on:   [] positioning   [] body mechanics   [] transfers   [] heat/ice application          Pain Level (0-10 scale) post treatment: 0/10    ASSESSMENT/Changes in Function: Began session with warm up on bike with pillow behind her back, followed by HS stretch B LE's. Continued with standing strengthening exercises and balance challenges. Continued SLS this date with limited UE support. Continued nautilus strengthening via leg press (44#), Knee extension (22#) and knee flexion (22#) for B LEs. Pt Pt progressing well. Cont POC. Patient will continue to benefit from skilled PT services to modify and progress therapeutic interventions, address functional mobility deficits, address ROM deficits, address strength deficits, analyze and cue movement patterns, and analyze and modify body mechanics/ergonomics to attain remaining goals.      [x]  See Plan of Care  []  See progress note/recertification  []  See Discharge Summary           PLAN  [x]  Upgrade activities as tolerated     [x]  Continue plan of care  []  Update interventions per flow sheet       []  Discharge due to:_  []  Other:_      BOOM Pham 1/16/2023  11:54 AM

## 2023-01-20 ENCOUNTER — APPOINTMENT (OUTPATIENT)
Dept: PHYSICAL THERAPY | Age: 73
End: 2023-01-20
Payer: MEDICARE

## 2023-01-30 ENCOUNTER — HOSPITAL ENCOUNTER (OUTPATIENT)
Dept: PHYSICAL THERAPY | Age: 73
End: 2023-01-30
Payer: MEDICARE

## 2023-01-30 PROCEDURE — 97110 THERAPEUTIC EXERCISES: CPT

## 2023-01-30 PROCEDURE — 97530 THERAPEUTIC ACTIVITIES: CPT

## 2023-01-30 NOTE — PROGRESS NOTES
PT DAILY TREATMENT NOTE 8-14    Patient Name: Patricia Naqvi  Date:2023  : 1950  [x]  Patient  Verified  Payor: Flaco Boyle / Plan: [facility] MEDICARE CHOICE PPO/PFFS / Product Type: Managed Care Medicare /    In YIQA:  Out time:1058  Total Treatment Time (min): 61  Total Timed Codes (min): 61  1:1 Treatment Time (min): 61   Visit #: 7 of 12    Treatment Area: Spondylolisthesis, site unspecified [M43.10]  Other intervertebral disc degeneration, lumbar region [M51.36]    SUBJECTIVE  Pt reports minor soreness in B knees. She states, \"my balance does feel off. \"\"    Pain Level (0-10 scale): 0    Any medication changes, allergies to medications, adverse drug reactions, diagnosis change, or new procedure performed?: [x] No    [] Yes (see summary sheet for update)    OBJECTIVE  Modality rationale: Declined   Min Type Additional Details    [] Estim: []Att   []Unatt  []TENS instruct                 []IFC  []Premod []NMES                       []Other:  []w/US   []w/ice   []w/heat  Position:  Location:    []  Traction: [] Cervical       []Lumbar                       [] Prone          []Supine                       []Intermittent   []Continuous Lbs:  [] before manual  [] after manual    []  Ultrasound: []Continuous   [] Pulsed                           []1MHz   []3MHz Location:  W/cm2:    []  Ice     []  heat  []  Ice massage Position:  Location:    []  Vasopneumatic Device Pressure: [] lo [] med [] hi   Temp: [] lo [] med [] hi   [] Skin assessment post-treatment:  []intact []redness- no adverse reaction       []redness - adverse reaction:     33 min Therapeutic Exercise:  [] See flow sheet :   Rationale: increase ROM, increase strength, improve coordination, improve balance, and increase proprioception to improve the patients ability to complete daily and function activities with full AROM and strength. 25 min Therapeutic Activity:  []  See flow sheet :   Rationale:  To improve static and dynamic balance. With TE  TA   NR  GT   [facility] Patient Education: [x] Review HEP    [] Progressed/Changed HEP based on:   [] positioning   [] body mechanics   [] transfers   [] heat/ice application        Pain Level (0-10 scale) post treatment: 0    ASSESSMENT/Changes in Function: Session began with an active warm up followed by B LE stretches. Pt completed exercises per flowsheet exhibiting proper form and tammy throughout. Introduced resisted side stepping and lateral step ups working to improve hip abductor strength and increase LE stability. Focused on balance inside and outside of parallel bars with the introduction and tandem walking and cone taps. Ankle strategy was observed to maintain balance. Pt remained pain free throughout today's session. Plan to continue POC progressing to achieve short term goals next visit. Patient will continue to benefit from skilled PT services to modify and progress therapeutic interventions, address functional mobility deficits, address ROM deficits, address strength deficits, analyze and address soft tissue restrictions, analyze and cue movement patterns, analyze and modify body mechanics/ergonomics, assess and modify postural abnormalities, and address imbalance/dizziness to attain remaining goals.      [x]  See Plan of Care  []  See progress note/recertification  []  See Discharge Summary         PLAN  []  Upgrade activities as tolerated     [x]  Continue plan of care  []  Update interventions per flow sheet       []  Discharge due to:_  []  Other:    Adela Noel  1/30/2023  11:38 AM

## 2023-02-03 ENCOUNTER — HOSPITAL ENCOUNTER (OUTPATIENT)
Dept: PHYSICAL THERAPY | Age: 73
Discharge: HOME | End: 2023-02-03
Payer: MEDICARE

## 2023-02-03 PROCEDURE — 97530 THERAPEUTIC ACTIVITIES: CPT

## 2023-02-03 PROCEDURE — 97110 THERAPEUTIC EXERCISES: CPT

## 2023-02-03 NOTE — PROGRESS NOTES
PT DAILY TREATMENT NOTE 8    Patient Name: Munira Lei  Date:2/3/2023  : 1950  [x]  Patient  Verified  Payor: Imani Do / Plan: [facility] MEDICARE CHOICE PPO/PFFS / Product Type: Managed Care Medicare /    In IEXH:[id number]  Out time:1054  Total Treatment Time (min): 59  Total Timed Codes (min): 59  1:1 Treatment Time (min): 59   Visit #: 8 of 12    Treatment Area: Spondylolisthesis, site unspecified [M43.10]  Other intervertebral disc degeneration, lumbar region [M51.36]    SUBJECTIVE  Pt reports \"I am doing good today. \" \"I still feel like my balance is a bit off. \" She denies falls. Pain Level (0-10 scale): 0    Any medication changes, allergies to medications, adverse drug reactions, diagnosis change, or new procedure performed?: [x] No    [] Yes (see summary sheet for update)    OBJECTIVE  Modality rationale: Declined   Min Type Additional Details    [] Estim: []Att   []Unatt  []TENS instruct                 []IFC  []Premod []NMES                       []Other:  []w/US   []w/ice   []w/heat  Position:  Location:    []  Traction: [] Cervical       []Lumbar                       [] Prone          []Supine                       []Intermittent   []Continuous Lbs:  [] before manual  [] after manual    []  Ultrasound: []Continuous   [] Pulsed                           []1MHz   []3MHz Location:  W/cm2:    []  Ice     []  heat  []  Ice massage Position:  Location:    []  Vasopneumatic Device Pressure: [] lo [] med [] hi   Temp: [] lo [] med [] hi   [] Skin assessment post-treatment:  []intact []redness- no adverse reaction       []redness - adverse reaction:     34 min Therapeutic Exercise:  [] See flow sheet :   Rationale: increase ROM, increase strength, improve coordination, improve balance, and increase proprioception to improve the patients ability to complete daily and function activities with full AROM and strength. 25 min Therapeutic Activity:  []  See flow sheet :   Rationale:  To improve static and dynamic balance. With TE  TA   NR  GT   [facility] Patient Education: [x] Review HEP    [] Progressed/Changed HEP based on:   [] positioning   [] body mechanics   [] transfers   [] heat/ice application        Pain Level (0-10 scale) post treatment: 0    ASSESSMENT/Changes in Function: Session began with an active warm up on stepper (bike was occupied) followed by B LE stretches. Sit to stands were performed against 30 second timier x 2 rounds yielding 9 full sit to stands completed each round. Pt completed exercises per flowsheet exhibiting proper form and tammy throughout. Continued resisted side stepping and lateral step ups working to improve hip abductor strength and increase LE stability. Focused on balance inside parallel bars with limited UE support via tandem walking and cone taps. Ankle strategy was observed to maintain balance. Continued with strength training via leg press double at 44# x 15 reps and 55# x 15 reps. Knee extension and hamstring curls at sports art using nautilus weights increased to 33# 2 x 10. Water and rest breaks taken as needed. Pt remained pain free throughout today's session. Muscle fatigue post nautilus exercises. Plan to continue POC progressing to achieve short term goals next visit. Patient will continue to benefit from skilled PT services to modify and progress therapeutic interventions, address functional mobility deficits, address ROM deficits, address strength deficits, analyze and address soft tissue restrictions, analyze and cue movement patterns, analyze and modify body mechanics/ergonomics, assess and modify postural abnormalities, and address imbalance/dizziness to attain remaining goals.      [x]  See Plan of Care  []  See progress note/recertification  []  See Discharge Summary         PLAN  []  Upgrade activities as tolerated     [x]  Continue plan of care  []  Update interventions per flow sheet       []  Discharge due to:_  [] Other:    BOOM Linares  2/3/2023  11:19 AM

## 2023-02-06 ENCOUNTER — HOSPITAL ENCOUNTER (OUTPATIENT)
Dept: PHYSICAL THERAPY | Age: 73
End: 2023-02-06
Payer: MEDICARE

## 2023-02-06 PROCEDURE — 97110 THERAPEUTIC EXERCISES: CPT

## 2023-02-06 NOTE — PROGRESS NOTES
PT DAILY TREATMENT NOTE 8-14    Patient Name: Imer [place]ub[place]  Date:2023  : 1950  [x]  Patient  Verified  Payor: [facility] / Plan: [facility] [employer] MEDICARE CHOICE PPO/PFFS / Product Type: Managed Care Medicare /    In time:1003  Out time:1056  Total Treatment Time (min): 53  Total Timed Codes (min): 53  1:1 Treatment Time (min): 53   Visit #: 9     Treatment Area: Spondylolisthesis, site unspecified [M43.10]  Other intervertebral disc degeneration, lumbar region [M51.36]    SUBJECTIVE  Pt states, \"my balance feels off today. \" She denies any pain. Pain Level (0-10 scale): 0    Any medication changes, allergies to medications, adverse drug reactions, diagnosis change, or new procedure performed?: [x] No    [] Yes (see summary sheet for update)    OBJECTIVE  Modality rationale: Declined   Min Type Additional Details    [] Estim: []Att   []Unatt  []TENS instruct                 []IFC  []Premod []NMES                       []Other:  []w/US   []w/ice   []w/heat  Position:  Location:    []  Traction: [] Cervical       []Lumbar                       [] Prone          []Supine                       []Intermittent   []Continuous Lbs:  [] before manual  [] after manual    []  Ultrasound: []Continuous   [] Pulsed                           []1MHz   []3MHz Location:  W/cm2:    []  Ice     []  heat  []  Ice massage Position:  Location:    []  Vasopneumatic Device Pressure: [] lo [] med [] hi   Temp: [] lo [] med [] hi   [] Skin assessment post-treatment:  []intact []redness- no adverse reaction       []redness - adverse reaction:     53 min Therapeutic Exercise:  [] See flow sheet :   Rationale: increase ROM, increase strength, improve coordination, improve balance, and increase proprioception to improve the patients ability to return to PLOF while remaining pain free.       With TE  TA   NR  GT   Misc Patient Education: [x] Review HEP    [] Progressed/Changed HEP based on:   [] positioning   [] body mechanics   [] transfers   [] heat/ice application        Pain Level (0-10 scale) post treatment: 0    ASSESSMENT/Changes in Function: Continued with exercises per flowsheet working to address balance deficits and improve strength. Session began with an active warm up followed by seated B LE stretches. Resistance increased with standing hip 4-way and double leg press with no adverse effects. Balance was tested with level and unleveled surfaces. Balance improved with repetitions. Plan to continue POC progressing as able. Patient will continue to benefit from skilled PT services to modify and progress therapeutic interventions, address functional mobility deficits, address ROM deficits, address strength deficits, analyze and address soft tissue restrictions, analyze and cue movement patterns, analyze and modify body mechanics/ergonomics, assess and modify postural abnormalities, and address imbalance/dizziness to attain remaining goals.      [x]  See Plan of Care  []  See progress note/recertification  []  See Discharge Summary         PLAN  []  Upgrade activities as tolerated     [x]  Continue plan of care  []  Update interventions per flow sheet       []  Discharge due to:_  []  Other:    Adela Rodgers  2/6/2023  12:35 PM

## 2023-02-10 ENCOUNTER — HOSPITAL ENCOUNTER (OUTPATIENT)
Dept: PHYSICAL THERAPY | Age: 73
End: 2023-02-10
Payer: MEDICARE

## 2023-02-10 PROCEDURE — 97110 THERAPEUTIC EXERCISES: CPT

## 2023-02-10 NOTE — PROGRESS NOTES
PT DAILY TREATMENT NOTE 8    Patient Name: Imer [place]  Date:2/10/2023  : 1950  [x]  Patient  Verified  Payor: [facility] / Plan: [facility] HUMANA MEDICARE CHOICE PPO/PFFS / Product Type: Managed Care Medicare /    In time:1004  Out time:1056  Total Treatment Time (min): 53  Total Timed Codes (min): 53  1:1 Treatment Time (min): 48   Visit #: 10     Treatment Area: Spondylolisthesis, site unspecified [M43.10]  Other intervertebral disc degeneration, lumbar region [M51.36]    SUBJECTIVE  Pt states that she was sore today, thinks she may have worked too much in yard yesterday because it was so pretty out. Pain Level (0-10 scale): 0    Any medication changes, allergies to medications, adverse drug reactions, diagnosis change, or new procedure performed?: [x] No    [] Yes (see summary sheet for update)    OBJECTIVE  Modality rationale: Declined   Min Type Additional Details    [] Estim: []Att   []Unatt  []TENS instruct                 []IFC  []Premod []NMES                       []Other:  []w/US   []w/ice   []w/heat  Position:  Location:    []  Traction: [] Cervical       []Lumbar                       [] Prone          []Supine                       []Intermittent   []Continuous Lbs:  [] before manual  [] after manual    []  Ultrasound: []Continuous   [] Pulsed                           []1MHz   []3MHz Location:  W/cm2:    []  Ice     []  heat  []  Ice massage Position:  Location:    []  Vasopneumatic Device Pressure: [] lo [] med [] hi   Temp: [] lo [] med [] hi   [] Skin assessment post-treatment:  []intact []redness- no adverse reaction       []redness - adverse reaction:     54 min Therapeutic Exercise:  [] See flow sheet :   Rationale: increase ROM, increase strength, improve coordination, improve balance, and increase proprioception to improve the patients ability to return to PLOF while remaining pain free.       With TE  TA   NR  GT   Misc Patient Education: [x] Review HEP    [] Progressed/Changed HEP based on:   [] positioning   [] body mechanics   [] transfers   [] heat/ice application        Pain Level (0-10 scale) post treatment: 0    ASSESSMENT/Changes in Function: Continued with exercises per flowsheet working to address balance deficits and improve strength. Session began with an active warm up followed by seated B LE stretches. Resistance increased with standing hip 4-way and double leg press with no adverse effects. Balance was tested with level and unleveled surfaces. Balance improved with repetitions. Plan to continue POC progressing as able. Patient will continue to benefit from skilled PT services to modify and progress therapeutic interventions, address functional mobility deficits, address ROM deficits, address strength deficits, analyze and address soft tissue restrictions, analyze and cue movement patterns, analyze and modify body mechanics/ergonomics, assess and modify postural abnormalities, and address imbalance/dizziness to attain remaining goals.      [x]  See Plan of Care  []  See progress note/recertification  []  See Discharge Summary         PLAN  []  Upgrade activities as tolerated     [x]  Continue plan of care  []  Update interventions per flow sheet       []  Discharge due to:_  []  Other:    Adela Rojo  2/10/2023  12:52 PM

## 2023-02-13 ENCOUNTER — HOSPITAL ENCOUNTER (OUTPATIENT)
Age: 73
Setting detail: RECURRING SERIES
Discharge: HOME | End: 2023-02-16
Payer: MEDICARE

## 2023-02-13 PROCEDURE — 97530 THERAPEUTIC ACTIVITIES: CPT

## 2023-02-13 PROCEDURE — 97164 PT RE-EVAL EST PLAN CARE: CPT

## 2023-02-13 PROCEDURE — 97110 THERAPEUTIC EXERCISES: CPT

## 2023-02-13 NOTE — PROGRESS NOTES
PT DAILY TREATMENT NOTE 8    Patient Name: Konrad Apgar  Date:2023  : 1950  [x]  Patient  Verified  Payor: Glroia Pulido / Plan: Chino Gaspar / Product Type: *No Product type* /    In time:   Out time: 1100  Total Treatment Time (min): 53  Total Timed Codes (min): 38  1:1 Treatment Time (min): 33   Visit # 11      Treatment Area: Spondylolisthesis, site unspecified [M43.10]    SUBJECTIVE  Pt reports she feels like she has become stronger in therapy and improved her balance. She notes daily compliance with HEP.   Pain Level (0-10 scale): 0/10  Any medication changes, allergies to medications, adverse drug reactions, diagnosis change, or new procedure performed?: [x] No    [] Yes (see summary sheet for update)        OBJECTIVE    Modality rationale:    Min Type Additional Details    [] Estim:  []Unatt       []IFC  []Premod                        []Other:  []w/ice   []w/heat  Position:  Location:    [] Estim: []Att    []TENS instruct  []NMES                    []Other:  []w/US   []w/ice   []w/heat  Position:  Location:         []  Ultrasound: []Continuous   [] Pulsed                           []1MHz   []3MHz Location:  W/cm2:         []  Ice     []  heat  []  Ice massage  []  Laser   []  Anodyne Position:  Location:         []  Vasopneumatic Device Pressure:       [] lo [] med [] hi   Temperature: [] lo [] med [] hi   [] Skin assessment post-treatment:  []intact []redness- no adverse reaction    []redness - adverse reaction:     15 min []Eval                  [x]Re-Eval     28 min Therapeutic Exercise:  [x] See flow sheet :   Rationale: increase ROM and increase strength to improve the patients ability to ambulate    10 min Therapeutic Activity:  [x]  See flow sheet :   Rationale: improve coordination, improve balance, and increase proprioception  to improve transfers and decrease fall risk             With   [] TE   [] TA   [] neuro   [] other: Patient Education: [x] Review HEP    [] Progressed/Changed HEP based on:   [] positioning   [] body mechanics   [] transfers   [] heat/ice application    [] other:        Pain Level (0-10 scale) post treatment: 0/10    ASSESSMENT/Changes in Function:   Pt participates in PT reassessment today to assess pt progress in therapy this reporting period. Session initiated on stepper followed by self-stretching and PRE. Today's Tx session emphasized exercises per POC to increase BLE strength and improve balance with Pt tolerating Tx with no adverse effects. Pt demonstrates improved activity tolerance when performing exercises during today's Tx session. Introduced tandem stance and pt tolerates well. Pt HEP updated and pt was educated on exercise dosing, DOMS, pain science, and safety awareness. PT will continue per POC, progressing as tolerated. Patient will continue to benefit from skilled PT services to modify and progress therapeutic interventions, address functional mobility deficits, address strength deficits, analyze and cue movement patterns, analyze and modify body mechanics/ergonomics, assess and modify postural abnormalities, and address imbalance/dizziness to attain remaining goals.      []  See Plan of Care  [x]  See progress note/recertification  []  See Discharge Summary             PLAN  []  Upgrade activities as tolerated     []  Continue plan of care  []  Update interventions per flow sheet       []  Discharge due to:_  []  Other:_      Andrea Valdes, PT, DPT 2/13/2023  2:48 PM

## 2023-02-13 NOTE — THERAPY RECERTIFICATION
Edouard Huitron, [facility], [address] [place] [address]  Phone: [phone]    Fax: [phone]   Progress Note/CONTINUED PLAN OF CARE for PHYSICAL THERAPY          Patient Name: Wally Tucker : 1950   Treatment/Medical Diagnosis: Spondylolisthesis, site unspecified [M43.10]   Onset Date:  2022    Referral Source: Flaco Luke MD Start of Care [facility] [place]): 2022   Prior Hospitalization: See Medical History Provider #: [id number]   Prior Level of Function: Independent; used no AD for ambulation   Comorbidities:  GERD, HTN, dyslipidemia   Medications: Verified on Patient Summary List   Visits from [facility]:  11 Missed Visits: 0   Subjective:  Pt reports she feels she has improved her strength and balance since starting therapy    Objective:  Objective Measures:  LEFS Score: 37.9  FOTO: 47  30STS: 11 reps  SLS:  LLE = 13 sec; RLE = 11 sec     ROM/Strength                   AROM                          PROM                       Strength (1-5)  Hip Left Right Left Right Left Right   Flexion [facility]     4+/5 4+/5   Extension [facility]     4+/5 4+/5   Abduction [facility]     4+/5 4+/5   Adduction [facility]     4+/5 4+/5   ER [facility] W[facility]     4+/5 4+/5   IR [facility]     5/5 5/5   Knee Left Right Left Right Left Right   [facility]     5/5 5/5   Flexion [facility] W[facility]     4+/5 4+/5         Short Term Goals: 3 weeks  Patient will report the knowledge of 3 exercises that can be used to help reduce symptoms to be able to ind reduce symptoms while at home. -MET  Patient will demonstrate a 1/2 grade improvement in BLE MMT to be able to better ambulate with a normalized gait without pain. -MET  Patient will increase LEFS > 10 points to facilitate increased ability to perform functional activities of choice. -PROGRESSING     Long Term Goals: 6 weeks  Patient will demonstrate a 2 grade improvement in BLE MMT to be able to better ambulate with a normalized gait without pain.  -PROGRESSING  Pt will safely complete 30 second STS test with a score of 10 in order to improve activity tolerance to improve functional mobility, decrease fall risk, and decrease caregiver dependence. -MET   Patient will demonstrate the ability to ambulate > 500 feet without an AD with normalized gait pattern to be able to return to an ind walking program following discharge. -MET  Pt will be able to safely complete Single Leg Stance balance test for 30 seconds on each LE for improved balance during gait in order to improve static/dynamic balance to improve functional mobility, decrease fall risk, and decrease caregiver dependence. -PROGRESSING  Patient will increase LEFS > 20 points to facilitate increased ability to perform leisure activities of choice. -PROGRESSING        Key Functional Changes/Progress: Pt has improved strength, balance, and activity tolerance  Problem List: decrease strength and impaired gait/ balance     Updated Plan of Care:    Treatment Plan to include the following per provider discretion: Theraputic Exercise, Manual Therapy, Gait and Balance Training, and Neuro-Re-ed, and Therapeutic activities    Frequency / Duration:  Patient to be seen   2   times per week for   6  weeks    Assessment/ Patient Update: This 66 y/o female presented to PT on 12/29/23 with decreased balance and reduced BLE strength and has been participating well in PT this reporting period. Pt is making good progress toward her goals and has increased strength, balance, and functional mobility. Pt HEP updated and pt has been educated on proper HEP dosing, walking program, DOMS, and safety awareness. Pt continues to exhibit deficits in balance, strength, and functional mobility. Pt will continue to benefit from skilled PT to address the above impairments. If you have any questions/comments please contact us directly at [phone]. Thank you for allowing us to assist in the care of your patient.     Therapist Signature:  Romelia Saxena Tapan PT, DPT Date: 5/98/9625   Certification Period:  Reporting Period:  2/13/23 - 5/13/23 12/29/22 - 2/13/23 Time: 3:19 PM   NOTE TO PHYSICIAN:  PLEASE COMPLETE THE ORDERS BELOW AND FAX TO   [facility]: [phone]. If you are unable to process this request in 24 hours please contact our office: [phone].    ___ I have read the above report and request that my patient continue as recommended.   ___ I have read the above report and request that my patient continue therapy with the following changes/special instructions: ________________________________________________   ___ I have read the above report and request that my patient be discharged from therapy.

## 2023-02-17 ENCOUNTER — HOSPITAL ENCOUNTER (OUTPATIENT)
Age: 73
Setting detail: RECURRING SERIES
Discharge: HOME | End: 2023-02-20
Payer: MEDICARE

## 2023-02-17 PROCEDURE — 97530 THERAPEUTIC ACTIVITIES: CPT

## 2023-02-17 PROCEDURE — 97110 THERAPEUTIC EXERCISES: CPT

## 2023-02-17 NOTE — PROGRESS NOTES
PT DAILY TREATMENT NOTE 8    Patient Name: Alesha Soria  Date:2023  : 1950  [x]  Patient  Verified  Payor: Woodrow Sites / Plan: Davonte Keating / Product Type: *No Product type* /    In time:1430  Out time:1530  Total Treatment Time (min): 60  Total Timed Codes (min): 60  1:1 Treatment Time (min): 60   Visit #: 12 of 24    Treatment Area: Spondylolisthesis, site unspecified [M43.10]    SUBJECTIVE  Pt enters stating she has no pain. Pain Level (0-10 scale): 0    Any medication changes, allergies to medications, adverse drug reactions, diagnosis change, or new procedure performed?: [x] No    [] Yes (see summary sheet for update)        OBJECTIVE  Modality rationale: N/A   Min Type Additional Details    [] Estim: []Att   []Unatt  []TENS instruct                 []IFC  []Premod []NMES                       []Other:  []w/US   []w/ice   []w/heat  Position:  Location:    []  Traction: [] Cervical       []Lumbar                       [] Prone          []Supine                       []Intermittent   []Continuous Lbs:  [] before manual  [] after manual    []  Ultrasound: []Continuous   [] Pulsed                           []1MHz   []3MHz Location:  W/cm2:    []  Iontophoresis with dexamethasone         Location: [] Take home patch   [] In clinic    []  Ice     []  heat  []  Ice massage Position:  Location:    []  Vasopneumatic Device Pressure: [] lo [] med [] hi   Temp: [] lo [] med [] hi   [] Skin assessment post-treatment:  []intact []redness- no adverse reaction       []redness - adverse reaction:           47 min Therapeutic Exercise:  [x] See flow sheet :   Rationale: increase ROM, increase strength, improve coordination, improve balance, and increase proprioception to improve the patients ability to  increase ROM and increase strength to improve the patients ability to ambulate safely.     13 min Therapeutic Activity:  [x]  See flow sheet :   Rationale: improve coordination, improve balance, and increase proprioception  to improve transfers and decrease fall risk      min Neuromuscular Re-education:  []  See flow sheet :   Rationale:        min Manual Therapy:     Rationale:      min Gait Training:  ___ feet with ___ device on level surfaces with ___ level of assist   Rationale: With TE  TA   NR  GT   [facility] [place]: [x] Review HEP    [] Progressed/Changed HEP based on:   [] positioning   [] body mechanics   [] transfers   [] heat/ice application          Pain Level (0-10 scale) post treatment: 0    ASSESSMENT/Changes in Function: Session began on stationary bike with a pillow behind her for active warm up. Followed by seated self stretching of the HS. Sit to stand performed with good standing posture and balance. Step ups, SL dead lift and low hurdles performed with limited UE support. Lateral walking with graded resistance band  with good step qian and step clearance. Continued timed tandem stance and pt tolerates well. Today's Tx session emphasized exercises per POC to increase BLE strength and improve balance with Pt tolerating session with no adverse effects. Pt demonstrates improved activity tolerance when performing exercises during today's session. Continue with strength training per nadonnellus via leg press, knee extension and knee flexion as document on flow sheet. Continue with POC. Patient will continue to benefit from skilled PT services to modify and progress therapeutic interventions, analyze and address functional mobility deficits, analyze and address strength deficits, and analyze and cue for proper movement patterns to attain remaining goals.        []  See Plan of Care  [x]  See progress note/recertification  []  See Discharge Summary           PLAN  []  Upgrade activities as tolerated     [x]  Continue plan of care  []  Update interventions per flow sheet       []  Discharge due to:_  []  Other:_      Otis Morris, PTA  2/17/2023  4:10 PM

## 2023-02-20 ENCOUNTER — HOSPITAL ENCOUNTER (OUTPATIENT)
Age: 73
Setting detail: RECURRING SERIES
Discharge: HOME | End: 2023-02-23
Payer: MEDICARE

## 2023-02-20 PROCEDURE — 97110 THERAPEUTIC EXERCISES: CPT

## 2023-02-20 PROCEDURE — 97530 THERAPEUTIC ACTIVITIES: CPT

## 2023-02-20 NOTE — PROGRESS NOTES
PT DAILY TREATMENT NOTE 8    Patient Name: Bing Jonas  Date:2023  : 1950  [x]  Patient  Verified  Payor: Suman Lobato / Plan: James Perkins / Product Type: *No Product type* /    In time:1030  Out time:1130  Total Treatment Time (min): 60  Total Timed Codes (min): 60  1:1 Treatment Time (min): 60   Visit #: 13 of 24    Treatment Area: Spondylolisthesis, site unspecified [M43.10]    SUBJECTIVE  Pt enters stating she has had some pain over the weekend noting it when bending the knees. Pain Level (0-10 scale): 1    Any medication changes, allergies to medications, adverse drug reactions, diagnosis change, or new procedure performed?: [x] No    [] Yes (see summary sheet for update)        OBJECTIVE  Modality rationale: N/A   Min Type Additional Details    [] Estim: []Att   []Unatt  []TENS instruct                 []IFC  []Premod []NMES                       []Other:  []w/US   []w/ice   []w/heat  Position:  Location:    []  Traction: [] Cervical       []Lumbar                       [] Prone          []Supine                       []Intermittent   []Continuous Lbs:  [] before manual  [] after manual    []  Ultrasound: []Continuous   [] Pulsed                           []1MHz   []3MHz Location:  W/cm2:    []  Iontophoresis with dexamethasone         Location: [] Take home patch   [] In clinic    []  Ice     []  heat  []  Ice massage Position:  Location:    []  Vasopneumatic Device Pressure: [] lo [] med [] hi   Temp: [] lo [] med [] hi   [] Skin assessment post-treatment:  []intact []redness- no adverse reaction       []redness - adverse reaction:           47 min Therapeutic Exercise:  [x] See flow sheet :   Rationale: increase ROM, increase strength, improve coordination, improve balance, and increase proprioception to improve the patients ability to  increase ROM and increase strength to improve the patients ability to ambulate safely.     13 min Therapeutic Activity:  [x] See flow sheet :   Rationale: improve coordination, improve balance, and increase proprioception  to improve transfers and decrease fall risk      min Neuromuscular Re-education:  []  See flow sheet :   Rationale:        min Manual Therapy:     Rationale:      min Gait Training:  ___ feet with ___ device on level surfaces with ___ level of assist   Rationale: With TE  TA   NR  GT   [facility] Patient Education: [x] Review HEP    [] Progressed/Changed HEP based on:   [] positioning   [] body mechanics   [] transfers   [] heat/ice application          Pain Level (0-10 scale) post treatment: 0    ASSESSMENT/Changes in Function: Session began on stationary bike with a pillow behind her for active warm up. Followed by seated self stretching of the HS. Sit to stand performed with good standing posture and balance. Step ups, SL dead lift and low hurdles performed with limited UE support. Lateral walking with graded resistance band  with good step qian and step clearance. Continued timed tandem stance and pt tolerates well. Incorporated times SLS and STS in effort to achieve long term goals. STS 10 and 11 reps. SLS shows ankle instability B LE's. Today's Tx session emphasized exercises per POC to increase BLE strength and improve balance with Pt tolerating session with no adverse effects. Pt demonstrates improved activity tolerance when performing exercises during today's session. Continue with strength training per william via leg press, knee extension and knee flexion as document on flow sheet. Continue with POC. Patient will continue to benefit from skilled PT services to modify and progress therapeutic interventions, analyze and address functional mobility deficits, analyze and address strength deficits, and analyze and cue for proper movement patterns to attain remaining goals.        []  See Plan of Care  [x]  See progress note/recertification  []  See Discharge Summary           PLAN  []  Upgrade activities as tolerated     [x]  Continue plan of care  []  Update interventions per flow sheet       []  Discharge due to:_  []  Other:_      Laura Laird, PTA  2/20/2023  11:30 AM

## 2023-02-24 ENCOUNTER — HOSPITAL ENCOUNTER (OUTPATIENT)
Age: 73
Setting detail: RECURRING SERIES
Discharge: HOME | End: 2023-02-27
Payer: MEDICARE

## 2023-02-24 PROCEDURE — 97530 THERAPEUTIC ACTIVITIES: CPT

## 2023-02-24 PROCEDURE — 97110 THERAPEUTIC EXERCISES: CPT

## 2023-02-24 NOTE — PROGRESS NOTES
PT DAILY TREATMENT NOTE 8    Patient Name: Romeo Duran  Date:2023  : 1950  [x]  Patient  Verified  Payor: Rachael Hands / Plan: Isaias Heater / Product Type: *No Product type* /    In time:1540  Out time: 1640  Total Treatment Time (min): 60  Total Timed Codes (min): 60  1:1 Treatment Time (min): 60   Visit #: 14 of 24    Treatment Area: Spondylolisthesis, site unspecified [M43.10]    SUBJECTIVE  Pt enters stating she is tired but will do what she can today. Denies pain. Pain Level (0-10 scale): 0    Any medication changes, allergies to medications, adverse drug reactions, diagnosis change, or new procedure performed?: [x] No    [] Yes (see summary sheet for update)        OBJECTIVE  Modality rationale: N/A   Min Type Additional Details    [] Estim: []Att   []Unatt  []TENS instruct                 []IFC  []Premod []NMES                       []Other:  []w/US   []w/ice   []w/heat  Position:  Location:    []  Traction: [] Cervical       []Lumbar                       [] Prone          []Supine                       []Intermittent   []Continuous Lbs:  [] before manual  [] after manual    []  Ultrasound: []Continuous   [] Pulsed                           []1MHz   []3MHz Location:  W/cm2:    []  Iontophoresis with dexamethasone         Location: [] Take home patch   [] In clinic    []  Ice     []  heat  []  Ice massage Position:  Location:    []  Vasopneumatic Device Pressure: [] lo [] med [] hi   Temp: [] lo [] med [] hi   [] Skin assessment post-treatment:  []intact []redness- no adverse reaction       []redness - adverse reaction:           47 min Therapeutic Exercise:  [x] See flow sheet :   Rationale: increase ROM, increase strength, improve coordination, improve balance, and increase proprioception to improve the patients ability to  increase ROM and increase strength to improve the patients ability to ambulate safely.     13 min Therapeutic Activity:  [x]  See flow sheet :   Rationale: improve coordination, improve balance, and increase proprioception  to improve transfers and decrease fall risk      min Neuromuscular Re-education:  []  See flow sheet :   Rationale:        min Manual Therapy:     Rationale:      min Gait Training:  ___ feet with ___ device on level surfaces with ___ level of assist   Rationale: With TE  TA   NR  GT   [facility] [place]] Review HEP    [] Progressed/Changed HEP based on:   [] positioning   [] body mechanics   [] transfers   [] heat/ice application          Pain Level (0-10 scale) post treatment: 0    ASSESSMENT/Changes in Function: Session began on stepper for active warm up. Followed by seated self stretching of the HS. Sit to stand performed with good standing posture and balance. Step ups, SL dead lift and low hurdles performed with limited UE support. Lateral walking with graded resistance band  (red) with good step qian and step clearance. Continued timed tandem stance and pt tolerates well. Continued with timed SLS in effort to achieve long term goals. SLS shows ankle instability B LE's. Today's Tx session emphasized exercises per POC to increase BLE strength and improve balance with Pt tolerating session with no adverse effects. Pt demonstrates improved activity tolerance when performing exercises during today's session. Continue with strength training per nautilus via leg press, knee extension and knee flexion as document on flow sheet. Continue with POC. Patient will continue to benefit from skilled PT services to modify and progress therapeutic interventions, analyze and address functional mobility deficits, analyze and address strength deficits, and analyze and cue for proper movement patterns to attain remaining goals.        []  See Plan of Care  [x]  See progress note/recertification  []  See Discharge Summary           PLAN  []  Upgrade activities as tolerated     [x]  Continue plan of care  []  Update interventions per flow sheet       []  Discharge due to:_  []  Other:_      Dara Navarro, ENDY  2/24/2023  4:45 AM

## 2023-02-27 ENCOUNTER — HOSPITAL ENCOUNTER (OUTPATIENT)
Age: 73
Setting detail: RECURRING SERIES
Discharge: HOME | End: 2023-03-02
Payer: MEDICARE

## 2023-02-27 ENCOUNTER — APPOINTMENT (OUTPATIENT)
Age: 73
End: 2023-02-27
Payer: MEDICARE

## 2023-02-27 PROCEDURE — 97530 THERAPEUTIC ACTIVITIES: CPT

## 2023-02-27 PROCEDURE — 97110 THERAPEUTIC EXERCISES: CPT

## 2023-02-27 NOTE — PROGRESS NOTES
Physical Therapy  PT DAILY TREATMENT NOTE 8    Patient Name: Marilou Granados  Date:2023  : 1950  [x]  Patient  Verified  Payor: [employer] / Plan: [employer] / Product Type: *No Product type* /    In time:1010  Out time: 1107  Total Treatment Time (min): 57  Total Timed Codes (min): 57  1:1 Treatment Time (min): 57   Visit #: 15 of 24    Treatment Area: Spondylolisthesis, site unspecified [M43.10]    SUBJECTIVE  Pt enters stating she is tired but will do what she can today. Denies pain.    Pain Level (0-10 scale): 0    Any medication changes, allergies to medications, adverse drug reactions, diagnosis change, or new procedure performed?: [x] No    [] Yes (see summary sheet for update)        OBJECTIVE  Modality rationale: N/A   Min Type Additional Details    [] Estim: []Att   []Unatt  []TENS instruct                 []IFC  []Premod []NMES                       []Other:  []w/US   []w/ice   []w/heat  Position:  Location:    []  Traction: [] Cervical       []Lumbar                       [] Prone          []Supine                       []Intermittent   []Continuous Lbs:  [] before manual  [] after manual    []  Ultrasound: []Continuous   [] Pulsed                           []1MHz   []3MHz Location:  W/cm2:    []  Iontophoresis with dexamethasone         Location: [] Take home patch   [] In clinic    []  Ice     []  heat  []  Ice massage Position:  Location:    []  Vasopneumatic Device Pressure: [] lo [] med [] hi   Temp: [] lo [] med [] hi   [] Skin assessment post-treatment:  []intact []redness- no adverse reaction       []redness - adverse reaction:           47 min Therapeutic Exercise:  [x] See flow sheet :   Rationale: increase ROM, increase strength, improve coordination, improve balance, and increase proprioception to improve the patient’s ability to  increase ROM and increase strength to improve the patient’s ability to ambulate safely.    10 min Therapeutic Activity:   [x]  See flow sheet :   Rationale: improve coordination, improve balance, and increase proprioception  to improve transfers and decrease fall risk      min Neuromuscular Re-education:  []  See flow sheet :   Rationale:        min Manual Therapy:     Rationale:      min Gait Training:  ___ feet with ___ device on level surfaces with ___ level of assist   Rationale: With TE  TA   NR  GT   [facility] [place] Patient Education: [x] Review HEP    [] Progressed/Changed HEP based on:   [] positioning   [] body mechanics   [] transfers   [] heat/ice application          Pain Level (0-10 scale) post treatment: 0    ASSESSMENT/Changes in Function: Session began on stepper for active warm up. Followed by seated self stretching of the HS. Sit to stand performed with good standing posture and balance. Achieved 13 reps in 30 seconds on 2 rounds. Continued with exercise per flow sheet. Lateral walking with graded resistance band  (red) with good step qian and step clearance. Continued timed tandem stance and pt tolerates well. Continued with timed SLS in effort to achieve long term goals. SLS shows ankle instability B LE's. Today's Tx session emphasized exercises per POC to increase BLE strength and improve balance with Pt tolerating session with no adverse effects. Pt demonstrates improved activity tolerance when performing exercises during today's session. Continue with strength training per william via leg press, knee extension and knee flexion as document on flow sheet. Continue with POC. Patient will continue to benefit from skilled PT services to modify and progress therapeutic interventions, analyze and address functional mobility deficits, analyze and address strength deficits, and analyze and cue for proper movement patterns to attain remaining goals.        []  See Plan of Care  [x]  See progress note/recertification  []  See Discharge Summary           PLAN  []  Upgrade activities as tolerated     [x]  Continue plan of care  []  Update interventions per flow sheet       []  Discharge due to:_  []  Other:_      Susan Mehta PTA  2/27/2023  11:57 AM

## 2023-03-03 ENCOUNTER — HOSPITAL ENCOUNTER (OUTPATIENT)
Age: 73
Setting detail: RECURRING SERIES
Discharge: HOME | End: 2023-03-06
Payer: MEDICARE

## 2023-03-03 PROCEDURE — 97110 THERAPEUTIC EXERCISES: CPT

## 2023-03-03 PROCEDURE — 97530 THERAPEUTIC ACTIVITIES: CPT

## 2023-03-03 NOTE — PROGRESS NOTES
Physical Therapy  Physical Therapy  PT DAILY TREATMENT NOTE     Patient Name: Hosea Arango  Date:3/3/2023  : 1950  [x]  Patient  Verified  Payor: Andrew Braun / Plan: Jovani Kirk / Product Type: *No Product type* /    In time:10:20  Out time: 11:18  Total Treatment Time (min): 52  Total Timed Codes (min): 52  1:1 Treatment Time (min): 52   Visit #: 16 of 24    Treatment Area: Spondylolisthesis, site unspecified [M43.10]    SUBJECTIVE  Pt enters clinic denies pain. Denies fall. \"I feel like I am getting stronger and doing better. \"    Pain Level (0-10 scale): 0    Any medication changes, allergies to medications, adverse drug reactions, diagnosis change, or new procedure performed?: [x] No    [] Yes (see summary sheet for update)        OBJECTIVE  Modality rationale: N/A   Min Type Additional Details    [] Estim: []Att   []Unatt  []TENS instruct                 []IFC  []Premod []NMES                       []Other:  []w/US   []w/ice   []w/heat  Position:  Location:    []  Traction: [] Cervical       []Lumbar                       [] Prone          []Supine                       []Intermittent   []Continuous Lbs:  [] before manual  [] after manual    []  Ultrasound: []Continuous   [] Pulsed                           []1MHz   []3MHz Location:  W/cm2:    []  Iontophoresis with dexamethasone         Location: [] Take home patch   [] In clinic    []  Ice     []  heat  []  Ice massage Position:  Location:    []  Vasopneumatic Device Pressure: [] lo [] med [] hi   Temp: [] lo [] med [] hi   [] Skin assessment post-treatment:  []intact []redness- no adverse reaction       []redness - adverse reaction:           42 min Therapeutic Exercise:  [x] See flow sheet :   Rationale: increase ROM, increase strength, improve coordination, improve balance, and increase proprioception to improve the patients ability to  increase ROM and increase strength to improve the patients ability to ambulate safely. 10 min Therapeutic Activity:  [x]  See flow sheet :   Rationale: improve coordination, improve balance, and increase proprioception  to improve transfers and decrease fall risk      min Neuromuscular Re-education:  []  See flow sheet :   Rationale:        min Manual Therapy:     Rationale:      min Gait Training:  ___ feet with ___ device on level surfaces with ___ level of assist   Rationale: With TE  TA   NR  GT   Misc Patient Education: [x] Review HEP    [] Progressed/Changed HEP based on:   [] positioning   [] body mechanics   [] transfers   [] heat/ice application          Pain Level (0-10 scale) post treatment: 0    ASSESSMENT/Changes in Function: Session began on stepper for active warm up. Followed by seated self stretching of the HS. Sit to stand performed with good standing posture and balance. Achieved 12 reps in 30 seconds on 2 rounds. Continued with exercise per flow sheet. Lateral walking with graded resistance band (red) on level surface with no UE support with good step qian and step clearance. Continued  tandem walking on level surface and pt tolerates well. Continued with timed SLS in effort to achieve long term goals. SLS shows ankle instability B LE's. Introduced balance with NBOS and Tandem stance on air-ex as documented on flow sheet. Today's Tx session emphasized exercises per POC to increase BLE strength and improve balance with Pt tolerating session with no adverse effects. Pt demonstrates improved activity tolerance when performing exercises during today's session. Continued with strength training per william via leg press, knee extension and knee flexion as document on flow sheet. Continue with POC.      Patient will continue to benefit from skilled PT services to modify and progress therapeutic interventions, analyze and address functional mobility deficits, analyze and address strength deficits, and analyze and cue for proper movement patterns to attain remaining goals.        []  See Plan of Care  [x]  See progress note/recertification  []  See Discharge Summary           PLAN  []  Upgrade activities as tolerated     [x]  Continue plan of care  []  Update interventions per flow sheet       []  Discharge due to:_  []  Other:_      Joseph Spring, PTA  3/3/2023  11:20 AM

## 2023-03-06 ENCOUNTER — HOSPITAL ENCOUNTER (OUTPATIENT)
Age: 73
Setting detail: RECURRING SERIES
Discharge: HOME | End: 2023-03-09
Payer: MEDICARE

## 2023-03-06 PROCEDURE — 97110 THERAPEUTIC EXERCISES: CPT

## 2023-03-06 PROCEDURE — 97530 THERAPEUTIC ACTIVITIES: CPT

## 2023-03-06 NOTE — PROGRESS NOTES
Physical Therapy  Physical Therapy  PT DAILY TREATMENT NOTE     Patient Name: Edith Gonzalez  Date:3/6/2023  : 1950  [x]  Patient  Verified  Payor: Lawyer Sloan / Plan: Ami June / Product Type: *No Product type* /    In time: 09:30  Out time: 10:30  Total Treatment Time (min): 60  Total Timed Codes (min): 60  1:1 Treatment Time (min): 60  Visit #: 17of 24    Treatment Area: Spondylolisthesis, site unspecified [M43.10]    SUBJECTIVE  Pt enters clinic denies pain. Denies fall. \"I feel like I am getting stronger and doing better. \" No new complaints.      Pain Level (0-10 scale): 0    Any medication changes, allergies to medications, adverse drug reactions, diagnosis change, or new procedure performed?: [x] No    [] Yes (see summary sheet for update)        OBJECTIVE  Modality rationale: N/A   Min Type Additional Details    [] Estim: []Att   []Unatt  []TENS instruct                 []IFC  []Premod []NMES                       []Other:  []w/US   []w/ice   []w/heat  Position:  Location:    []  Traction: [] Cervical       []Lumbar                       [] Prone          []Supine                       []Intermittent   []Continuous Lbs:  [] before manual  [] after manual    []  Ultrasound: []Continuous   [] Pulsed                           []1MHz   []3MHz Location:  W/cm2:    []  Iontophoresis with dexamethasone         Location: [] Take home patch   [] In clinic    []  Ice     []  heat  []  Ice massage Position:  Location:    []  Vasopneumatic Device Pressure: [] lo [] med [] hi   Temp: [] lo [] med [] hi   [] Skin assessment post-treatment:  []intact []redness- no adverse reaction       []redness - adverse reaction:           50 min Therapeutic Exercise:  [x] See flow sheet :   Rationale: increase ROM, increase strength, improve coordination, improve balance, and increase proprioception to improve the patients ability to  increase ROM and increase strength to improve the patients ability to ambulate safely. 10 min Therapeutic Activity:  [x]  See flow sheet :   Rationale: improve coordination, improve balance, and increase proprioception  to improve transfers and decrease fall risk      min Neuromuscular Re-education:  []  See flow sheet :   Rationale:        min Manual Therapy:     Rationale:      min Gait Training:  ___ feet with ___ device on level surfaces with ___ level of assist   Rationale: With TE  TA   NR  GT   Misc Patient Education: [x] Review HEP    [] Progressed/Changed HEP based on:   [] positioning   [] body mechanics   [] transfers   [] heat/ice application          Pain Level (0-10 scale) post treatment: 0    ASSESSMENT/Changes in Function: Session began on stepper for active warm up. Followed by seated self stretching of the HS. Sit to stand performed with good standing posture and balance. Achieved 13 reps in 30 seconds on 2 rounds. Continued with exercise per flow sheet. Lateral walking with graded resistance band (increased to green) on level surface with no UE support with good step qian and step clearance. Continued  tandem walking on level surface and pt tolerates well. Continued with timed SLS in effort to achieve long term goals. SLS shows ankle instability B LE's. Continued balance with NBOS and Tandem stance on air-ex as documented on flow sheet. Today's Tx session emphasized exercises per POC to increase BLE strength and improve balance with Pt tolerating session with no adverse effects. Pt demonstrates improved activity tolerance when performing exercises during today's session. Continued with strength training per william via leg press, knee extension and knee flexion as document on flow sheet. Continue with POC.      Patient will continue to benefit from skilled PT services to modify and progress therapeutic interventions, analyze and address functional mobility deficits, analyze and address strength deficits, and analyze and cue for proper movement patterns to attain remaining goals.        []  See Plan of Care  [x]  See progress note/recertification  []  See Discharge Summary           PLAN  []  Upgrade activities as tolerated     [x]  Continue plan of care  []  Update interventions per flow sheet       []  Discharge due to:_  []  Other:_      Catie Umana, ENDY  3/6/2023  11:48 AM

## 2023-03-10 ENCOUNTER — HOSPITAL ENCOUNTER (OUTPATIENT)
Age: 73
Setting detail: RECURRING SERIES
Discharge: HOME | End: 2023-03-13
Payer: MEDICARE

## 2023-03-10 PROCEDURE — 97530 THERAPEUTIC ACTIVITIES: CPT

## 2023-03-10 PROCEDURE — 97110 THERAPEUTIC EXERCISES: CPT

## 2023-03-10 NOTE — PROGRESS NOTES
Physical Therapy  Physical Therapy  Physical Therapy  PT DAILY TREATMENT NOTE     Patient Name: Miley Lopez  Date:3/10/2023  : 1950  [x]  Patient  Verified  Payor: Tristian Dubois / Plan: Desiree Carrion / Product Type: *No Product type* /    In time: 09:30 Out time: 10:25  Total Treatment Time (min): 55  Total Timed Codes (min): 55  1:1 Treatment Time (min): 55  Visit #: 18 of 24    Treatment Area: Spondylolisthesis, site unspecified [M43.10]    SUBJECTIVE  Pt enters clinic denies pain. Denies fall. \"I feel like I am getting stronger and doing better. \" No complaints this date.     Pain Level (0-10 scale): 0    Any medication changes, allergies to medications, adverse drug reactions, diagnosis change, or new procedure performed?: [x] No    [] Yes (see summary sheet for update)        OBJECTIVE  Modality rationale: N/A   Min Type Additional Details    [] Estim: []Att   []Unatt  []TENS instruct                 []IFC  []Premod []NMES                       []Other:  []w/US   []w/ice   []w/heat  Position:  Location:    []  Traction: [] Cervical       []Lumbar                       [] Prone          []Supine                       []Intermittent   []Continuous Lbs:  [] before manual  [] after manual    []  Ultrasound: []Continuous   [] Pulsed                           []1MHz   []3MHz Location:  W/cm2:    []  Iontophoresis with dexamethasone         Location: [] Take home patch   [] In clinic    []  Ice     []  heat  []  Ice massage Position:  Location:    []  Vasopneumatic Device Pressure: [] lo [] med [] hi   Temp: [] lo [] med [] hi   [] Skin assessment post-treatment:  []intact []redness- no adverse reaction       []redness - adverse reaction:           44 min Therapeutic Exercise:  [x] See flow sheet :   Rationale: increase ROM, increase strength, improve coordination, improve balance, and increase proprioception to improve the patients ability to  increase ROM and increase strength to improve the patient’s ability to ambulate safely.    11 min Therapeutic Activity:  [x]  See flow sheet :   Rationale: improve coordination, improve balance, and increase proprioception  to improve transfers and decrease fall risk      min Neuromuscular Re-education:  []  See flow sheet :   Rationale:        min Manual Therapy:     Rationale:      min Gait Training:  ___ feet with ___ device on level surfaces with ___ level of assist   Rationale:           With TE  TA   NR  GT   Misc Patient Education: [x] Review HEP    [] Progressed/Changed HEP based on:   [] positioning   [] body mechanics   [] transfers   [] heat/ice application          Pain Level (0-10 scale) post treatment: 0    ASSESSMENT/Changes in Function: Session began on stepper for active warm up. Followed by seated self stretching of the HS.  Sit to stand performed with good standing posture and balance. Achieved 12 reps in 30 seconds on 2 rounds. Continued with exercise per flow sheet.  Lateral walking with graded resistance band (progressed to green) on level surface with no UE support with good step qian and step clearance. Continued  tandem walking on level surface and pt tolerates well. Continued with timed SLS in effort to achieve long term goals. SLS shows ankle instability B LE's via balance with NBOS and Tandem stance on air-ex as documented on flow sheet. Today's Tx session emphasized exercises per POC to increase BLE strength and improve balance with Pt tolerating session with no adverse effects. Pt demonstrates improved activity tolerance when performing exercises during today's session. Continued with strength training per nautilus via leg press, knee extension and knee flexion as document on flow sheet. Continue with POC. Pt is planned and agrees to DC next week.    Patient will continue to benefit from skilled PT services to modify and progress therapeutic interventions, analyze and address functional mobility deficits, analyze and  address strength deficits, and analyze and cue for proper movement patterns to attain remaining goals.        []  See Plan of Care  [x]  See progress note/recertification  []  See Discharge Summary           PLAN  []  Upgrade activities as tolerated     [x]  Continue plan of care  []  Update interventions per flow sheet       []  Discharge due to:_  []  Other:_      Jose Luis Peters, ENDY  3/10/2023  12:01 PM

## 2023-03-13 ENCOUNTER — HOSPITAL ENCOUNTER (OUTPATIENT)
Age: 73
Setting detail: RECURRING SERIES
Discharge: HOME | End: 2023-03-16
Payer: MEDICARE

## 2023-03-13 PROCEDURE — 97110 THERAPEUTIC EXERCISES: CPT

## 2023-03-13 PROCEDURE — 97530 THERAPEUTIC ACTIVITIES: CPT

## 2023-03-13 NOTE — PROGRESS NOTES
Physical Therapy  Physical Therapy  Physical Therapy  PT DAILY TREATMENT NOTE     Patient Name: Nati Minor  Date:3/13/2023  : 1950  [x]  Patient  Verified  Payor: Jesus Sam / Plan: Felicitas Perez / Product Type: *No Product type* /    In time: 12:00 Out time: 12:51  Total Treatment Time (min): 51  Total Timed Codes (min): 51  1:1 Treatment Time (min): 51  Visit #:  of 24    Treatment Area: Spondylolisthesis, site unspecified [M43.10]    SUBJECTIVE  Pt enters clinic denies pain. Denies fall. \"I feel like I am getting stronger and doing better. \" No complaints this date. \"I am ready to be discharged. \"    Pain Level (0-10 scale): 0    Any medication changes, allergies to medications, adverse drug reactions, diagnosis change, or new procedure performed?: [x] No    [] Yes (see summary sheet for update)        OBJECTIVE  Modality rationale: N/A   Min Type Additional Details    [] Estim: []Att   []Unatt  []TENS instruct                 []IFC  []Premod []NMES                       []Other:  []w/US   []w/ice   []w/heat  Position:  Location:    []  Traction: [] Cervical       []Lumbar                       [] Prone          []Supine                       []Intermittent   []Continuous Lbs:  [] before manual  [] after manual    []  Ultrasound: []Continuous   [] Pulsed                           []1MHz   []3MHz Location:  W/cm2:    []  Iontophoresis with dexamethasone         Location: [] Take home patch   [] In clinic    []  Ice     []  heat  []  Ice massage Position:  Location:    []  Vasopneumatic Device Pressure: [] lo [] med [] hi   Temp: [] lo [] med [] hi   [] Skin assessment post-treatment:  []intact []redness- no adverse reaction       []redness - adverse reaction:           41 min Therapeutic Exercise:  [x] See flow sheet :   Rationale: increase ROM, increase strength, improve coordination, improve balance, and increase proprioception to improve the patients ability to  increase ROM and increase strength to improve the patients ability to ambulate safely. 10 min Therapeutic Activity:  [x]  See flow sheet :   Rationale: improve coordination, improve balance, and increase proprioception  to improve transfers and decrease fall risk      min Neuromuscular Re-education:  []  See flow sheet :   Rationale:        min Manual Therapy:     Rationale:      min Gait Training:  ___ feet with ___ device on level surfaces with ___ level of assist   Rationale: With TE  TA   NR  GT   Misc Patient Education: [x] Review HEP    [] Progressed/Changed HEP based on:   [] positioning   [] body mechanics   [] transfers   [] heat/ice application          Pain Level (0-10 scale) post treatment: 0    ASSESSMENT/Changes in Function: Session began on stepper for active warm up. Followed by seated self stretching of the HS. Sit to stand performed with good standing posture and balance. Achieved 12 reps in 30 seconds on 2 rounds. Continued with exercise per flow sheet. Lateral walking with graded resistance band (green) on level surface with no UE support with good step qian and step clearance. Continued  tandem walking on level surface and pt tolerates well. Continued with timed SLS in effort to achieve long term goals. SLS shows ankle instability B LE's via balance with NBOS and Tandem stance on air-ex as documented on flow sheet. Today's Tx session emphasized exercises per POC to increase BLE strength and improve balance with Pt tolerating session with no adverse effects. Pt demonstrates improved activity tolerance when performing exercises during today's session. Continued with strength training per nautilus via leg press, knee extension and knee flexion as document on flow sheet. Continue with POC. Pt is planned and agrees to DC next visit.     Patient will continue to benefit from skilled PT services to modify and progress therapeutic interventions, analyze and address functional mobility deficits, analyze and address strength deficits, and analyze and cue for proper movement patterns to attain remaining goals.        []  See Plan of Care  [x]  See progress note/recertification  []  See Discharge Summary           PLAN  []  Upgrade activities as tolerated     [x]  Continue plan of care  []  Update interventions per flow sheet       []  Discharge due to:_  []  Other:_      Dulce Wells PTA  3/13/2023  2:00 PM

## 2023-03-17 ENCOUNTER — HOSPITAL ENCOUNTER (OUTPATIENT)
Age: 73
Setting detail: RECURRING SERIES
Discharge: HOME | End: 2023-03-20
Payer: MEDICARE

## 2023-03-17 PROCEDURE — 97164 PT RE-EVAL EST PLAN CARE: CPT

## 2023-03-17 PROCEDURE — 97110 THERAPEUTIC EXERCISES: CPT

## 2023-03-17 NOTE — PROGRESS NOTES
body mechanics   [] transfers   [] heat/ice application    [] other:        Pain Level (0-10 scale) post treatment: 0/10    ASSESSMENT/Changes in Function:   Pt participates in PT reassessment today to assess pt progress in therapy this reporting period. Session initiated on *** followed by self-stretching and PRE. Today's Tx session emphasized exercises per POC to *** with Pt tolerating Tx with no adverse effects. Pt demonstrates *** when performing exercises during today's Tx session. Introduced *** and pt tolerates well. Pt HEP updated and pt was educated on exercise dosing, DOMS, pain science, and safety awareness. PT will continue per POC, progressing as tolerated. Patient will continue to benefit from skilled PT services to {[facility] INMOTION ASSESSMENT STATEMENTS:29159:a} to attain remaining goals.      []  See Plan of Care  []  See progress note/recertification  []  See Discharge Summary             PLAN  []  Upgrade activities as tolerated     []  Continue plan of care  []  Update interventions per flow sheet       []  Discharge due to:_  []  Other:_      Lucia Menendez, PT, DPT 3/17/2023  5:15 PM

## 2023-12-07 ENCOUNTER — OFFICE VISIT (OUTPATIENT)
Age: 73
End: 2023-12-07

## 2023-12-07 VITALS — BODY MASS INDEX: 37.38 KG/M2 | HEIGHT: 61 IN | WEIGHT: 198 LBS

## 2023-12-07 DIAGNOSIS — M79.641 PAIN IN BOTH HANDS: Primary | ICD-10-CM

## 2023-12-07 DIAGNOSIS — M79.642 PAIN IN BOTH HANDS: Primary | ICD-10-CM

## 2024-03-19 DIAGNOSIS — M79.641 PAIN IN BOTH HANDS: ICD-10-CM

## 2024-03-19 DIAGNOSIS — M79.642 PAIN IN BOTH HANDS: ICD-10-CM

## 2024-03-28 ENCOUNTER — OFFICE VISIT (OUTPATIENT)
Age: 74
End: 2024-03-28
Payer: MEDICARE

## 2024-03-28 DIAGNOSIS — M18.11 PRIMARY OSTEOARTHRITIS OF FIRST CARPOMETACARPAL JOINT OF RIGHT HAND: Primary | ICD-10-CM

## 2024-03-28 DIAGNOSIS — M18.12 PRIMARY OSTEOARTHRITIS OF FIRST CARPOMETACARPAL JOINT OF LEFT HAND: ICD-10-CM

## 2024-03-28 PROCEDURE — 3017F COLORECTAL CA SCREEN DOC REV: CPT | Performed by: ORTHOPAEDIC SURGERY

## 2024-03-28 PROCEDURE — 1123F ACP DISCUSS/DSCN MKR DOCD: CPT | Performed by: ORTHOPAEDIC SURGERY

## 2024-03-28 PROCEDURE — G8417 CALC BMI ABV UP PARAM F/U: HCPCS | Performed by: ORTHOPAEDIC SURGERY

## 2024-03-28 PROCEDURE — G8400 PT W/DXA NO RESULTS DOC: HCPCS | Performed by: ORTHOPAEDIC SURGERY

## 2024-03-28 PROCEDURE — 1090F PRES/ABSN URINE INCON ASSESS: CPT | Performed by: ORTHOPAEDIC SURGERY

## 2024-03-28 PROCEDURE — 99212 OFFICE O/P EST SF 10 MIN: CPT | Performed by: ORTHOPAEDIC SURGERY

## 2024-03-28 PROCEDURE — G8484 FLU IMMUNIZE NO ADMIN: HCPCS | Performed by: ORTHOPAEDIC SURGERY

## 2024-03-28 PROCEDURE — 1036F TOBACCO NON-USER: CPT | Performed by: ORTHOPAEDIC SURGERY

## 2024-03-28 PROCEDURE — G8427 DOCREV CUR MEDS BY ELIG CLIN: HCPCS | Performed by: ORTHOPAEDIC SURGERY

## 2024-03-28 RX ORDER — CEPHALEXIN 500 MG/1
CAPSULE ORAL
COMMUNITY
Start: 2024-03-05

## 2024-03-28 RX ORDER — LOSARTAN POTASSIUM 100 MG/1
TABLET ORAL
COMMUNITY
Start: 2023-12-29

## 2024-03-28 RX ORDER — POLYETHYLENE GLYCOL-3350 AND ELECTROLYTES 236; 6.74; 5.86; 2.97; 22.74 G/274.31G; G/274.31G; G/274.31G; G/274.31G; G/274.31G
POWDER, FOR SOLUTION ORAL
COMMUNITY
Start: 2024-02-02

## 2024-03-28 RX ORDER — METOPROLOL SUCCINATE 25 MG/1
TABLET, EXTENDED RELEASE ORAL
COMMUNITY
Start: 2024-03-15

## 2024-03-28 RX ORDER — TRAZODONE HYDROCHLORIDE 50 MG/1
TABLET ORAL
COMMUNITY
Start: 2024-02-16

## 2024-03-28 NOTE — PATIENT INSTRUCTIONS
Hand Pain: Care Instructions  Overview     Common causes of hand pain are overuse and injuries, such as might happen during sports or home repair projects. Everyday wear and tear, especially as you get older, also can cause hand pain.  Most minor hand injuries will heal on their own, and home treatment is usually all you need to do. If you have sudden and severe pain, you may need tests and treatment.  Follow-up care is a key part of your treatment and safety. Be sure to make and go to all appointments, and call your doctor if you are having problems. It's also a good idea to know your test results and keep a list of the medicines you take.  How can you care for yourself at home?  Take pain medicines exactly as directed.  If the doctor gave you a prescription medicine for pain, take it as prescribed.  If you are not taking a prescription pain medicine, ask your doctor if you can take an over-the-counter medicine.  Rest and protect your hand. Take a break from any activity that may cause pain.  Put ice or a cold pack on your hand for 10 to 20 minutes at a time. Put a thin cloth between the ice and your skin.  Prop up the sore hand on a pillow when you ice it or anytime you sit or lie down during the next 3 days. Try to keep it above the level of your heart. This will help reduce swelling.  If your doctor recommends a sling, splint, or elastic bandage to support your hand, wear it as directed.  When should you call for help?   Call 911 anytime you think you may need emergency care. For example, call if:    Your hand turns cool or pale or changes color.   Call your doctor now or seek immediate medical care if:    You cannot move your hand.     Your hand pops, moves out of its normal position, and then returns to its normal position.     You have signs of infection, such as:  Increased pain, swelling, warmth, or redness.  Red streaks leading from the sore area.  Pus draining from a place on your hand.  A fever.     Your

## 2024-03-28 NOTE — PROGRESS NOTES
Name: Marilou Granados    : 1950     [facility]  [address], [address]  [facility] [address]  Dept: [phone]  Dept Fax: [phone]     Chief Complaint   Patient presents with    Hand Pain        There were no vitals taken for this visit.     No Known Allergies     Current Outpatient Medications   Medication Sig Dispense Refill    cephALEXin (KEFLEX) 500 MG capsule       losartan (COZAAR) 100 MG tablet       metoprolol succinate (TOPROL XL) 25 MG extended release tablet       GAVILYTE-G 236 g solution       traZODone (DESYREL) 50 MG tablet       aspirin 81 MG EC tablet Take 81 mg by mouth daily      baclofen (LIORESAL) 10 MG tablet Take 10 mg by mouth 3 times daily as needed      buPROPion (WELLBUTRIN XL) 150 MG extended release tablet Take 150 mg by mouth      Calcium Carbonate-Vitamin D 600-3.125 MG-MCG TABS Take 1 tablet by mouth      ergocalciferol (ERGOCALCIFEROL) 1.25 MG (90783 UT) capsule Take 50,000 Units by mouth every 7 days      hydroCHLOROthiazide (HYDRODIURIL) 25 MG tablet Take 1 tablet by mouth daily      losartan-hydroCHLOROthiazide (HYZAAR) 100-25 MG per tablet Take 1 tablet by mouth daily      metFORMIN (GLUCOPHAGE-XR) 500 MG extended release tablet Take 1 tablet by mouth daily      pantoprazole (PROTONIX) 40 MG tablet Take 40 mg by mouth daily      simvastatin (ZOCOR) 40 MG tablet Take 40 mg by mouth      sulindac (CLINORIL) 200 MG tablet Take 200 mg by mouth 2 times daily      topiramate (TOPAMAX) 100 MG tablet ceived the following from [facility]: Outside name: topiramate (TOPAMAX) 100 mg tablet       No current facility-administered medications for this visit.      Patient Active Problem List   Diagnosis    Knee osteoarthritis    Severe obesity (HCC)    Essential hypertension      Family History   Problem Relation Age of Onset    Heart Disease Mother     Cancer Father     Heart

## 2025-04-03 ENCOUNTER — OFFICE VISIT (OUTPATIENT)
Age: 75
End: 2025-04-03

## 2025-04-03 VITALS — BODY MASS INDEX: 36.32 KG/M2 | HEIGHT: 60 IN | WEIGHT: 185 LBS

## 2025-04-03 DIAGNOSIS — M25.552 LEFT HIP PAIN: ICD-10-CM

## 2025-04-03 DIAGNOSIS — M17.12 OSTEOARTHRITIS OF LEFT KNEE, UNSPECIFIED OSTEOARTHRITIS TYPE: ICD-10-CM

## 2025-04-03 DIAGNOSIS — M25.562 LEFT KNEE PAIN, UNSPECIFIED CHRONICITY: Primary | ICD-10-CM

## 2025-04-03 RX ORDER — LIDOCAINE HYDROCHLORIDE 10 MG/ML
9 INJECTION, SOLUTION INFILTRATION; PERINEURAL ONCE
Status: COMPLETED | OUTPATIENT
Start: 2025-04-03 | End: 2025-04-03

## 2025-04-03 RX ORDER — TRIAMCINOLONE ACETONIDE 40 MG/ML
40 INJECTION, SUSPENSION INTRA-ARTICULAR; INTRAMUSCULAR ONCE
Status: COMPLETED | OUTPATIENT
Start: 2025-04-03 | End: 2025-04-03

## 2025-04-03 RX ADMIN — TRIAMCINOLONE ACETONIDE 40 MG: 40 INJECTION, SUSPENSION INTRA-ARTICULAR; INTRAMUSCULAR at 10:59

## 2025-04-03 RX ADMIN — LIDOCAINE HYDROCHLORIDE 9 ML: 10 INJECTION, SOLUTION INFILTRATION; PERINEURAL at 10:59

## 2025-04-03 NOTE — PROGRESS NOTES
Name: Marilou Granados    : 1950     [facility]  [address], [address]  [facility] [address]  Dept: [phone]  Dept Fax: [phone]     Chief Complaint   Patient presents with    Knee Pain    Hip Pain        Ht 1.524 m (5')   Wt 83.9 kg (185 lb)   BMI 36.13 kg/m²      No Known Allergies     Current Outpatient Medications   Medication Sig Dispense Refill    cephALEXin (KEFLEX) 500 MG capsule       losartan (COZAAR) 100 MG tablet       metoprolol succinate (TOPROL XL) 25 MG extended release tablet       GAVILYTE-G 236 g solution       traZODone (DESYREL) 50 MG tablet       aspirin 81 MG EC tablet Take 81 mg by mouth daily      baclofen (LIORESAL) 10 MG tablet Take 10 mg by mouth 3 times daily as needed      buPROPion (WELLBUTRIN XL) 150 MG extended release tablet Take 150 mg by mouth      Calcium Carbonate-Vitamin D 600-3.125 MG-MCG TABS Take 1 tablet by mouth      ergocalciferol (ERGOCALCIFEROL) 1.25 MG (05544 UT) capsule Take 50,000 Units by mouth every 7 days      hydroCHLOROthiazide (HYDRODIURIL) 25 MG tablet Take 1 tablet by mouth daily      losartan-hydroCHLOROthiazide (HYZAAR) 100-25 MG per tablet Take 1 tablet by mouth daily      metFORMIN (GLUCOPHAGE-XR) 500 MG extended release tablet Take 1 tablet by mouth daily      pantoprazole (PROTONIX) 40 MG tablet Take 40 mg by mouth daily      simvastatin (ZOCOR) 40 MG tablet Take 40 mg by mouth      sulindac (CLINORIL) 200 MG tablet Take 200 mg by mouth 2 times daily      topiramate (TOPAMAX) 100 MG tablet ceived the following from [facility]: Outside name: topiramate (TOPAMAX) 100 mg tablet       No current facility-administered medications for this visit.       Patient Active Problem List   Diagnosis    Knee osteoarthritis    Severe obesity    Essential hypertension      Family History   Problem Relation Age of Onset    Heart Disease Mother

## (undated) DEVICE — ZIMMER® STERILE DISPOSABLE TOURNIQUET CUFF WITH PLC, DUAL PORT, SINGLE BLADDER, 34 IN. (86 CM)

## (undated) DEVICE — STERILE POLYISOPRENE POWDER-FREE SURGICAL GLOVES WITH EMOLLIENT COATING: Brand: PROTEXIS

## (undated) DEVICE — 3M™ COBAN™ NL STERILE NON-LATEX SELF-ADHERENT WRAP, 2084S, 4 IN X 5 YD (10 CM X 4,5 M), 18 ROLLS/CASE: Brand: 3M™ COBAN™

## (undated) DEVICE — STERILE POLYISOPRENE POWDER-FREE SURGICAL GLOVES: Brand: PROTEXIS

## (undated) DEVICE — UNDERGLOVE SURG SZ 7.5 BLU LTX FREE SYN POLYISOPRENE

## (undated) DEVICE — SKIN CLOS DERMABND PRINEO 60CM -- DERMABOUND PRINEO

## (undated) DEVICE — STRYKER PERFORMANCE SERIES SAGITTAL BLADE: Brand: STRYKER PERFORMANCE SERIES

## (undated) DEVICE — ATTUNE PINNING SYSTEM

## (undated) DEVICE — 3M™ IOBAN™ 2 ANTIMICROBIAL INCISE DRAPE 6650EZ: Brand: IOBAN™ 2

## (undated) DEVICE — GLOVE SURG 7 BIOGEL PI ULTRATOUCH G

## (undated) DEVICE — SUTURE VCRL SZ 0 L27IN ABSRB UD L36MM CT-1 1/2 CIR J260H

## (undated) DEVICE — SUTURE VCRL SZ 1 L27IN ABSRB UD CT-1 L36MM 1/2 CIR J261H

## (undated) DEVICE — HOOD: Brand: FLYTE, SURGICOOL

## (undated) DEVICE — SLEEVE COMPR UNIV WOMEN REG FT GARMENTS

## (undated) DEVICE — (D)HANDPIECE IRR W/HI FLO TIP -- DUPLICATE USE ITEM 121586

## (undated) DEVICE — SHEET,DRAPE,70X100,STERILE: Brand: MEDLINE

## (undated) DEVICE — RECIPROCATING BLADE HEAVY DUTY LONG, OFFSET  (77.6 X 0.77 X 11.2MM)

## (undated) DEVICE — BOWL BNE CEM MIX SPAT CURET SMARTMIX CTS

## (undated) DEVICE — GOWN,AURORA,FABRIC-REINFORCED,X-LARGE: Brand: MEDLINE

## (undated) DEVICE — 3M™ TEGADERM™ HP TRANSPARENT FILM DRESSING FRAME STYLE, 9546HP, 4 IN X 4-1/2 IN (10 CM X 11.5 CM), 50/CT 4CT/CASE: Brand: 3M™ TEGADERM™

## (undated) DEVICE — HYPODERMIC SAFETY NEEDLE: Brand: MAGELLAN

## (undated) DEVICE — DRAPE,TOP,102X53,STERILE: Brand: MEDLINE

## (undated) DEVICE — SUTURE MCRYL SZ 3-0 L27IN ABSRB UD L24MM PS-1 3/8 CIR PRIM Y936H

## (undated) DEVICE — CEMENT BNE 40GM FULL DOSE PMMA W/ GENT HI VISC RADPQ LNG

## (undated) DEVICE — (D)PREP SKN CHLRAPRP APPL 26ML -- CONVERT TO ITEM 371833

## (undated) DEVICE — INTENDED FOR TISSUE SEPARATION, AND OTHER PROCEDURES THAT REQUIRE A SHARP SURGICAL BLADE TO PUNCTURE OR CUT.: Brand: BARD-PARKER SAFETY BLADES SIZE 10, STERILE

## (undated) DEVICE — HOOD, PEEL-AWAY: Brand: FLYTE

## (undated) DEVICE — MANIFOLD CART ULT HI FLOW W 3 PRT FOR SUCT

## (undated) DEVICE — SPONGE LAP SOFT 18X18 IN X RAY DETECTABLE

## (undated) DEVICE — TOWEL,OR,DSP,ST,BLUE,STD,4/PK,20PK/CS: Brand: MEDLINE

## (undated) DEVICE — TOTAL KNEE PACK: Brand: MEDLINE INDUSTRIES, INC.

## (undated) DEVICE — DRESSING SURG 4X14 IN POSTOP SIL BORD MEPILEX

## (undated) DEVICE — BNDG,ELSTC,MATRIX,STRL,6"X5YD,LF,HOOK&LP: Brand: MEDLINE

## (undated) DEVICE — COVER,TABLE,HEAVY DUTY,77"X90",STRL: Brand: MEDLINE

## (undated) DEVICE — SUTURE VCRL SZ 2-0 L27IN ABSRB UD L26MM CT-2 1/2 CIR J269H